# Patient Record
Sex: MALE | Race: WHITE | NOT HISPANIC OR LATINO | Employment: FULL TIME | URBAN - METROPOLITAN AREA
[De-identification: names, ages, dates, MRNs, and addresses within clinical notes are randomized per-mention and may not be internally consistent; named-entity substitution may affect disease eponyms.]

---

## 2019-11-25 ENCOUNTER — TELEPHONE (OUTPATIENT)
Dept: FAMILY MEDICINE | Facility: CLINIC | Age: 39
End: 2019-11-25

## 2019-12-05 RX ORDER — ALPRAZOLAM 0.5 MG/1
0.5 TABLET ORAL 2 TIMES DAILY
Refills: 2 | COMMUNITY
Start: 2019-10-24 | End: 2019-12-05 | Stop reason: SDUPTHER

## 2019-12-05 RX ORDER — ALPRAZOLAM 0.5 MG/1
0.5 TABLET ORAL 2 TIMES DAILY
Qty: 60 TABLET | Refills: 0 | Status: SHIPPED | OUTPATIENT
Start: 2019-12-05 | End: 2019-12-10 | Stop reason: SDUPTHER

## 2019-12-05 NOTE — TELEPHONE ENCOUNTER
----- Message from Michelle Layne sent at 12/5/2019  9:41 AM CST -----  Contact: amanda  Refill Xanax [facility] # [phone]. The patient is leaving to go work in [employer] at 12:30  today.  He is out. He will be home for his apt. GH

## 2019-12-10 ENCOUNTER — OFFICE VISIT (OUTPATIENT)
Dept: FAMILY MEDICINE | Facility: CLINIC | Age: 39
End: 2019-12-10
Payer: COMMERCIAL

## 2019-12-10 VITALS
HEIGHT: 70 IN | BODY MASS INDEX: 27.66 KG/M2 | SYSTOLIC BLOOD PRESSURE: 136 MMHG | DIASTOLIC BLOOD PRESSURE: 84 MMHG | WEIGHT: 193.19 LBS | HEART RATE: 72 BPM

## 2019-12-10 DIAGNOSIS — F41.9 ANXIETY: Primary | ICD-10-CM

## 2019-12-10 DIAGNOSIS — R03.0 PRE-HYPERTENSION: ICD-10-CM

## 2019-12-10 PROCEDURE — 99213 OFFICE O/P EST LOW 20 MIN: CPT | Mod: S$GLB,,, | Performed by: PHYSICIAN ASSISTANT

## 2019-12-10 PROCEDURE — 99213 PR OFFICE/OUTPT VISIT, EST, LEVL III, 20-29 MIN: ICD-10-PCS | Mod: S$GLB,,, | Performed by: PHYSICIAN ASSISTANT

## 2019-12-10 PROCEDURE — 3008F BODY MASS INDEX DOCD: CPT | Mod: S$GLB,,, | Performed by: PHYSICIAN ASSISTANT

## 2019-12-10 PROCEDURE — 3008F PR BODY MASS INDEX (BMI) DOCUMENTED: ICD-10-PCS | Mod: S$GLB,,, | Performed by: PHYSICIAN ASSISTANT

## 2019-12-10 RX ORDER — ALPRAZOLAM 1 MG/1
1 TABLET ORAL 2 TIMES DAILY
Qty: 60 TABLET | Refills: 2 | Status: SHIPPED | OUTPATIENT
Start: 2019-12-10 | End: 2020-03-23 | Stop reason: SDUPTHER

## 2019-12-10 NOTE — PROGRESS NOTES
SUBJECTIVE:    Patient ID: Al Romero is a 39 y.o. male.    Chief Complaint: Anxiety (Pt presents today for anxiety.  States he was placed on Xanax (1 in am and 1 in pm), but has been having to take 2 in the am.  Pt also reports fatigue.....mlr) and Medication Refill    39-year-old white male presents today for checkup.  Reports he is still fighting anxiety pretty much daily.  He admits that he is using 2 of the Xanax in the morning, drinking energy drinks throughout the day, and still experiencing some fatigue.  Blood work was all normal earlier this year.       No visits with results within 6 Month(s) from this visit.   Latest known visit with results is:   No results found for any previous visit.       Past Medical History:   Diagnosis Date    Anxiety      History reviewed. No pertinent surgical history.  History reviewed. No pertinent family history.    Marital Status:   Alcohol History:  reports that he drinks alcohol.  Tobacco History:  reports that he has been smoking cigarettes and vaping with nicotine. He has never used smokeless tobacco.  Drug History:  reports that he does not use drugs.    Review of patient's allergies indicates:  No Known Allergies    Current Outpatient Medications:     ALPRAZolam (XANAX) 1 MG tablet, Take 1 tablet (1 mg total) by mouth 2 (two) times daily., Disp: 60 tablet, Rfl: 2    Review of Systems   Constitutional: Negative for activity change, fatigue, fever and unexpected weight change.   HENT: Negative for congestion.    Respiratory: Negative for apnea, cough, chest tightness and shortness of breath.    Cardiovascular: Negative for chest pain and palpitations.   Gastrointestinal: Negative for abdominal distention and abdominal pain.   Genitourinary: Negative for difficulty urinating and dysuria.   Musculoskeletal: Negative for arthralgias and back pain.   Neurological: Negative for dizziness and weakness.          Objective:      Vitals:    12/10/19 1502 12/10/19  "1509 12/10/19 1537   BP: (!) 150/86 (!) 150/90 136/84   Pulse: 72     Weight: 87.6 kg (193 lb 3.2 oz)     Height: 5' 10" (1.778 m)       Physical Exam   Constitutional: He is oriented to person, place, and time. He appears well-developed and well-nourished. No distress.   HENT:   Head: Normocephalic and atraumatic.   Eyes: Pupils are equal, round, and reactive to light.   Neck: Normal range of motion. Neck supple. No thyromegaly present.   Cardiovascular: Normal rate, regular rhythm, normal heart sounds and intact distal pulses.   Pulmonary/Chest: Effort normal and breath sounds normal.   Abdominal: Soft. Bowel sounds are normal. He exhibits no distension. There is no tenderness.   Musculoskeletal: Normal range of motion.   Neurological: He is alert and oriented to person, place, and time. No cranial nerve deficit.   Skin: Skin is warm and dry. No rash noted. No erythema.         Assessment:       1. Anxiety    2. Pre-hypertension         Plan:       Anxiety  Comments:  Doing fairly well, has been taking equivalent of 1mg BID. Will refill. Stress with  and kids at home.  Orders:  -     ALPRAZolam (XANAX) 1 MG tablet; Take 1 tablet (1 mg total) by mouth 2 (two) times daily.  Dispense: 60 tablet; Refill: 2    Pre-hypertension  Comments:  Discussed caffeine, vaping, energy drinks. Will continue to monitor closely. Checked by me in clinic today.      Follow up in about 3 months (around 3/10/2020) for Annual Physical.        12/10/2019 Devaughn Banegas PA-C    "

## 2019-12-10 NOTE — PATIENT INSTRUCTIONS
Anxiety Reaction  Anxiety is the feeling we all get when we think something bad might happen. It is a normal response to stress and usually causes only a mild reaction. When anxiety becomes more severe, it can interfere with daily life. In some cases, you may not even be aware of what it is youre anxious about. There may also be a genetic link or it may be a learned behavior in the home.  Both psychological and physical triggers cause stress reaction. It's often a response to fear or emotional stress, real or imagined. This stress may come from home, family, work, or social relationships.  During an anxiety reaction, you may feel:  · Helpless  · Nervous  · Depressed  · Irritable  Your body may show signs of anxiety in many ways. You may experience:  · Dry mouth  · Shakiness  · Dizziness  · Weakness  · Trouble breathing  · Breathing fast (hyperventilating)  · Chest pressure  · Sweating  · Headache  · Nausea  · Diarrhea  · Tiredness  · Inability to sleep  · Sexual problems  Home care  · Try to locate the sources of stress in your life. They may not be obvious. These may include:  ¨ Daily hassles of life (traffic jams, missed appointments, car troubles, etc.)  ¨ Major life changes, both good (new baby, job promotion) and bad (loss of job, loss of loved one)  ¨ Overload: feeling that you have too many responsibilities and can't take care of all of them at once  ¨ Feeling helpless, feeling that your problems are beyond what youre able to solve  · Notice how your body reacts to stress. Learn to listen to your body signals. This will help you take action before the stress becomes severe.  · When you can, do something about the source of your stress. (Avoid hassles, limit the amount of change that happens in your life at one time and take a break when you feel overloaded).  · Unfortunately, many stressful situations can't be avoided. It is necessary to learn how to better manage stress. There are many proven methods  that will reduce your anxiety. These include simple things like exercise, good nutrition and adequate rest. Also, there are certain techniques that are helpful:  ¨ Relaxation  ¨ Breathing exercises  ¨ Visualization  ¨ Biofeedback  ¨ Meditation  For more information about this, consult your doctor or go to a local bookstore and review the many books and tapes available on this subject.  Follow-up care  If you feel that your anxiety is not responding to self-help measures, contact your doctor or make an appointment with a counselor. You may need short-term psychological counseling and temporary medicine to help you manage stress.  Call 911  Call your healthcare provider right away if any of these occur:  · Trouble breathing  · Confusion  · Drowsiness or trouble wakening  · Fainting or loss of consciousness  · Rapid heart rate  · Seizure  · New chest pain that becomes more severe, lasts longer, or spreads into your shoulder, arm, neck, jaw, or back  When to seek medical advice  Call your healthcare provider right away if any of these occur:  · Your symptoms get worse  · Severe headache not relieved by rest and mild pain reliever  Date Last Reviewed: 9/29/2015  © 6011-1272 [employer]. [address] [place] [address]. All rights reserved. This information is not intended as a substitute for professional medical care. Always follow your healthcare professional's instructions.

## 2020-03-17 ENCOUNTER — TELEPHONE (OUTPATIENT)
Dept: FAMILY MEDICINE | Facility: CLINIC | Age: 40
End: 2020-03-17

## 2020-03-17 DIAGNOSIS — F41.9 ANXIETY: ICD-10-CM

## 2020-03-17 RX ORDER — ALPRAZOLAM 1 MG/1
1 TABLET ORAL 2 TIMES DAILY
Qty: 60 TABLET | Refills: 2 | Status: CANCELLED | OUTPATIENT
Start: 2020-03-17 | End: 2020-06-15

## 2020-03-17 NOTE — TELEPHONE ENCOUNTER
----- Message from Liat Linares sent at 3/17/2020 11:19 AM CDT -----  Refills on xanax 1 mg to [facility]  # [phone]

## 2020-03-23 ENCOUNTER — TELEPHONE (OUTPATIENT)
Dept: FAMILY MEDICINE | Facility: CLINIC | Age: 40
End: 2020-03-23

## 2020-03-23 ENCOUNTER — PATIENT MESSAGE (OUTPATIENT)
Dept: FAMILY MEDICINE | Facility: CLINIC | Age: 40
End: 2020-03-23

## 2020-03-23 DIAGNOSIS — F41.9 ANXIETY: ICD-10-CM

## 2020-03-23 RX ORDER — ALPRAZOLAM 1 MG/1
1 TABLET ORAL 2 TIMES DAILY
Qty: 60 TABLET | Refills: 2 | Status: SHIPPED | OUTPATIENT
Start: 2020-03-23 | End: 2020-06-25 | Stop reason: SDUPTHER

## 2020-03-23 NOTE — TELEPHONE ENCOUNTER
"----- Message from Alma Shaw MA sent at 3/23/2020  2:42 PM CDT -----  Pt would like to schedule a virtual visit with Lalo for a cough, fatigue, bodyaches x 1 week and headaches.  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  Has not traveled outside of the country.  Reports he is a smoker.  Please return call.    Pt - [phone]  "

## 2020-03-24 ENCOUNTER — OFFICE VISIT (OUTPATIENT)
Dept: FAMILY MEDICINE | Facility: CLINIC | Age: 40
End: 2020-03-24
Payer: COMMERCIAL

## 2020-03-24 DIAGNOSIS — F33.1 MAJOR DEPRESSIVE DISORDER, RECURRENT, MODERATE: ICD-10-CM

## 2020-03-24 DIAGNOSIS — R05.9 COUGH: Primary | ICD-10-CM

## 2020-03-24 DIAGNOSIS — R53.82 CHRONIC FATIGUE: ICD-10-CM

## 2020-03-24 DIAGNOSIS — J30.1 SEASONAL ALLERGIC RHINITIS DUE TO POLLEN: ICD-10-CM

## 2020-03-24 DIAGNOSIS — F17.200 CURRENT EVERY DAY SMOKER: ICD-10-CM

## 2020-03-24 PROCEDURE — 99213 OFFICE O/P EST LOW 20 MIN: CPT | Mod: 95,,, | Performed by: NURSE PRACTITIONER

## 2020-03-24 PROCEDURE — 99213 PR OFFICE/OUTPT VISIT, EST, LEVL III, 20-29 MIN: ICD-10-PCS | Mod: 95,,, | Performed by: NURSE PRACTITIONER

## 2020-03-24 RX ORDER — METHYLPREDNISOLONE 4 MG/1
TABLET ORAL
Qty: 1 PACKAGE | Refills: 0 | Status: SHIPPED | OUTPATIENT
Start: 2020-03-24 | End: 2020-04-14

## 2020-03-24 NOTE — PROGRESS NOTES
Subjective:        The chief complaint leading to consultation is: Cough  The patient location is:  in his car  Visit type: Virtual visit with synchronous audio and video    Pt c/o cough since last Monday. Believes it is his seasonal allergies or a sinus infection. Reports cough is productive upon first waking but becomes more of a dry tickle throughout the day. 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 He has recently stopped taking Ibuprofen d/t news reports to not take ibuprofen with covid 19 virus outbreak. He states he has been taking Tylenol off and on. Denies fevers or SOB. Pt is current everyday smoker. He is fatigued but reports chronic fatigue for last few years. Deals with anxiety and takes Xanax regularly. Has taken claritin here and there as well as otc cough medication. Cough does not keep him awake at night.         No past surgical history on file.  Past Medical History:   Diagnosis Date    Anxiety      No family history on file.     Social History:   Marital Status:   Alcohol History:  reports that he drinks alcohol.  Tobacco History:  reports that he has been smoking cigarettes and vaping with nicotine. He has never used smokeless tobacco.  Drug History:  reports that he does not use drugs.    Review of patient's allergies indicates:  No Known Allergies    Current Outpatient Medications   Medication Sig Dispense Refill    ALPRAZolam (XANAX) 1 MG tablet Take 1 tablet (1 mg total) by mouth 2 (two) times daily. 60 tablet 2    methylPREDNISolone (MEDROL DOSEPACK) 4 mg tablet use as directed 1 Package 0     No current facility-administered medications for this visit.        Review of Systems   Constitutional: Positive for fatigue. Negative for chills, diaphoresis and fever.   HENT: Positive for postnasal drip and rhinorrhea. Negative for congestion, sinus pressure and sore throat.    Respiratory: Positive for cough. Negative for shortness of breath and  wheezing.    Cardiovascular: Negative for chest pain and palpitations.   Gastrointestinal: Negative for nausea.   Musculoskeletal: Positive for myalgias.   Neurological: Negative for headaches.         Objective:        Physical Exam:   Physical Exam   Constitutional: He is oriented to person, place, and time. He appears well-developed and well-nourished. No distress.   Neurological: He is alert and oriented to person, place, and time.   Psychiatric: He has a normal mood and affect.            Assessment:       1. Cough    2. Seasonal allergic rhinitis due to pollen    3. Chronic fatigue    4. Major depressive disorder, recurrent, moderate    5. Current every day smoker      Plan:   Cough  -     methylPREDNISolone (MEDROL DOSEPACK) 4 mg tablet; use as directed  Dispense: 1 Package; Refill: 0    Seasonal allergic rhinitis due to pollen  -     methylPREDNISolone (MEDROL DOSEPACK) 4 mg tablet; use as directed  Dispense: 1 Package; Refill: 0  - Advised patient to take otc allergy medication everyday as it works better when taken this way. Also suggested use of Flonase daily during allergy season.    Chronic fatigue  - Consider workup at later date    Major depressive disorder, recurrent, moderate  - continue Xanax    Current every day smoker  - uncomplicated    Follow up if symptoms worsen or fail to improve.    Total time spent with patient: 20    Each patient to whom he or she provides medical services by telemedicine is:  (1) informed of the relationship between the physician and patient and the respective role of any other health care provider with respect to management of the patient; and (2) notified that he or she may decline to receive medical services by telemedicine and may withdraw from such care at any time.    This note was created using [employer] voice recognition software that occasionally misinterprets phrases or words.

## 2020-03-31 ENCOUNTER — PATIENT MESSAGE (OUTPATIENT)
Dept: FAMILY MEDICINE | Facility: CLINIC | Age: 40
End: 2020-03-31

## 2020-04-13 ENCOUNTER — PATIENT MESSAGE (OUTPATIENT)
Dept: FAMILY MEDICINE | Facility: CLINIC | Age: 40
End: 2020-04-13

## 2020-04-13 DIAGNOSIS — R05.3 PERSISTENT COUGH FOR 3 WEEKS OR LONGER: Primary | ICD-10-CM

## 2020-04-13 NOTE — TELEPHONE ENCOUNTER
I ordered a CXR to get done and I would also like some basic labs as well. They have been placed for [facility]. CXR he can get done anytime at the imaging center.

## 2020-04-14 ENCOUNTER — HOSPITAL ENCOUNTER (OUTPATIENT)
Dept: RADIOLOGY | Facility: HOSPITAL | Age: 40
Discharge: HOME | End: 2020-04-14
Attending: PHYSICIAN ASSISTANT
Payer: COMMERCIAL

## 2020-04-14 DIAGNOSIS — R05.3 PERSISTENT COUGH FOR 3 WEEKS OR LONGER: ICD-10-CM

## 2020-04-14 PROCEDURE — 71046 X-RAY EXAM CHEST 2 VIEWS: CPT | Mod: TC,PO

## 2020-04-15 LAB
ALBUMIN SERPL-MCNC: 4.3 G/DL (ref 3.6–5.1)
ALBUMIN/GLOB SERPL: 1.8 (CALC) (ref 1–2.5)
ALP SERPL-CCNC: 96 U/L (ref 36–130)
ALT SERPL-CCNC: 17 U/L (ref 9–46)
AST SERPL-CCNC: 12 U/L (ref 10–40)
BASOPHILS # BLD AUTO: 101 CELLS/UL (ref 0–200)
BASOPHILS NFR BLD AUTO: 1.5 %
BILIRUB SERPL-MCNC: 0.5 MG/DL (ref 0.2–1.2)
BUN SERPL-MCNC: 11 MG/DL (ref 7–25)
BUN/CREAT SERPL: NORMAL (CALC) (ref 6–22)
CALCIUM SERPL-MCNC: 9.3 MG/DL (ref 8.6–10.3)
CHLORIDE SERPL-SCNC: 107 MMOL/L (ref 98–110)
CO2 SERPL-SCNC: 27 MMOL/L (ref 20–32)
CREAT SERPL-MCNC: 0.9 MG/DL (ref 0.6–1.35)
EOSINOPHIL # BLD AUTO: 241 CELLS/UL (ref 15–500)
EOSINOPHIL NFR BLD AUTO: 3.6 %
ERYTHROCYTE [DISTWIDTH] IN BLOOD BY AUTOMATED COUNT: 12.6 % (ref 11–15)
GFRSERPLBLD MDRD-ARVRAT: 109 ML/MIN/1.73M2
GLOBULIN SER CALC-MCNC: 2.4 G/DL (CALC) (ref 1.9–3.7)
GLUCOSE SERPL-MCNC: 100 MG/DL (ref 65–139)
HCT VFR BLD AUTO: 44.4 % (ref 38.5–50)
HGB BLD-MCNC: 15.2 G/DL (ref 13.2–17.1)
LYMPHOCYTES # BLD AUTO: 1816 CELLS/UL (ref 850–3900)
LYMPHOCYTES NFR BLD AUTO: 27.1 %
MCH RBC QN AUTO: 32.3 PG (ref 27–33)
MCHC RBC AUTO-ENTMCNC: 34.2 G/DL (ref 32–36)
MCV RBC AUTO: 94.3 FL (ref 80–100)
MONOCYTES # BLD AUTO: 844 CELLS/UL (ref 200–950)
MONOCYTES NFR BLD AUTO: 12.6 %
NEUTROPHILS # BLD AUTO: 3698 CELLS/UL (ref 1500–7800)
NEUTROPHILS NFR BLD AUTO: 55.2 %
PLATELET # BLD AUTO: 230 THOUSAND/UL (ref 140–400)
PMV BLD REES-ECKER: 11 FL (ref 7.5–12.5)
POTASSIUM SERPL-SCNC: 3.9 MMOL/L (ref 3.5–5.3)
PROT SERPL-MCNC: 6.7 G/DL (ref 6.1–8.1)
RBC # BLD AUTO: 4.71 MILLION/UL (ref 4.2–5.8)
SODIUM SERPL-SCNC: 141 MMOL/L (ref 135–146)
WBC # BLD AUTO: 6.7 THOUSAND/UL (ref 3.8–10.8)

## 2020-06-24 ENCOUNTER — PATIENT MESSAGE (OUTPATIENT)
Dept: FAMILY MEDICINE | Facility: CLINIC | Age: 40
End: 2020-06-24

## 2020-06-25 ENCOUNTER — OFFICE VISIT (OUTPATIENT)
Dept: FAMILY MEDICINE | Facility: CLINIC | Age: 40
End: 2020-06-25
Payer: COMMERCIAL

## 2020-06-25 VITALS
HEIGHT: 70 IN | TEMPERATURE: 99 F | SYSTOLIC BLOOD PRESSURE: 138 MMHG | WEIGHT: 186 LBS | DIASTOLIC BLOOD PRESSURE: 88 MMHG | HEART RATE: 68 BPM | BODY MASS INDEX: 26.63 KG/M2

## 2020-06-25 DIAGNOSIS — F41.9 ANXIETY: ICD-10-CM

## 2020-06-25 DIAGNOSIS — R53.82 CHRONIC FATIGUE: Primary | ICD-10-CM

## 2020-06-25 PROCEDURE — 99214 OFFICE O/P EST MOD 30 MIN: CPT | Mod: S$GLB,,, | Performed by: PHYSICIAN ASSISTANT

## 2020-06-25 PROCEDURE — 3008F BODY MASS INDEX DOCD: CPT | Mod: S$GLB,,, | Performed by: PHYSICIAN ASSISTANT

## 2020-06-25 PROCEDURE — 3008F PR BODY MASS INDEX (BMI) DOCUMENTED: ICD-10-PCS | Mod: S$GLB,,, | Performed by: PHYSICIAN ASSISTANT

## 2020-06-25 PROCEDURE — 99214 PR OFFICE/OUTPT VISIT, EST, LEVL IV, 30-39 MIN: ICD-10-PCS | Mod: S$GLB,,, | Performed by: PHYSICIAN ASSISTANT

## 2020-06-25 RX ORDER — ALPRAZOLAM 1 MG/1
1 TABLET ORAL 2 TIMES DAILY
Qty: 60 TABLET | Refills: 5 | Status: SHIPPED | OUTPATIENT
Start: 2020-06-25 | End: 2020-12-16 | Stop reason: SDUPTHER

## 2020-06-25 NOTE — PATIENT INSTRUCTIONS
Anxiety Reaction  Anxiety is the feeling we all get when we think something bad might happen. It is a normal response to stress and usually causes only a mild reaction. When anxiety becomes more severe, it can interfere with daily life. In some cases, you may not even be aware of what it is youre anxious about. There may also be a genetic link or it may be a learned behavior in the home.  Both psychological and physical triggers cause stress reaction. It's often a response to fear or emotional stress, real or imagined. This stress may come from home, family, work, or social relationships.  During an anxiety reaction, you may feel:  · Helpless  · Nervous  · Depressed  · Irritable  Your body may show signs of anxiety in many ways. You may experience:  · Dry mouth  · Shakiness  · Dizziness  · Weakness  · Trouble breathing  · Breathing fast (hyperventilating)  · Chest pressure  · Sweating  · Headache  · Nausea  · Diarrhea  · Tiredness  · Inability to sleep  · Sexual problems  Home care  · Try to locate the sources of stress in your life. They may not be obvious. These may include:  ¨ Daily hassles of life (traffic jams, missed appointments, car troubles, etc.)  ¨ Major life changes, both good (new baby, job promotion) and bad (loss of job, loss of loved one)  ¨ Overload: feeling that you have too many responsibilities and can't take care of all of them at once  ¨ Feeling helpless, feeling that your problems are beyond what youre able to solve  · Notice how your body reacts to stress. Learn to listen to your body signals. This will help you take action before the stress becomes severe.  · When you can, do something about the source of your stress. (Avoid hassles, limit the amount of change that happens in your life at one time and take a break when you feel overloaded).  · Unfortunately, many stressful situations can't be avoided. It is necessary to learn how to better manage stress. There are many proven methods  that will reduce your anxiety. These include simple things like exercise, good nutrition and adequate rest. Also, there are certain techniques that are helpful:  ¨ Relaxation  ¨ Breathing exercises  ¨ Visualization  ¨ Biofeedback  ¨ Meditation  For more information about this, consult your doctor or go to a local bookstore and review the many books and tapes available on this subject.  Follow-up care  If you feel that your anxiety is not responding to self-help measures, contact your doctor or make an appointment with a counselor. You may need short-term psychological counseling and temporary medicine to help you manage stress.  Call 911  Call your healthcare provider right away if any of these occur:  · Trouble breathing  · Confusion  · Drowsiness or trouble wakening  · Fainting or loss of consciousness  · Rapid heart rate  · Seizure  · New chest pain that becomes more severe, lasts longer, or spreads into your shoulder, arm, neck, jaw, or back  When to seek medical advice  Call your healthcare provider right away if any of these occur:  · Your symptoms get worse  · Severe headache not relieved by rest and mild pain reliever  Date Last Reviewed: 9/29/2015  © 5176-1263 [employer]. [address] [place] [address]. All rights reserved. This information is not intended as a substitute for professional medical care. Always follow your healthcare professional's instructions.

## 2020-06-25 NOTE — PROGRESS NOTES
SUBJECTIVE:    Patient ID: lA Romero is a 39 y.o. male.    Chief Complaint: Medication Refill (no bottles, went over meds verbally// SW)    40 yo wm presents for regular checkup and refills. Reports that he has been doing pretty well overall. Now pretty well managed with prn alprazolam use but usually morning and evening. Has been intolerant of other medications to help with anxiety and mood. New baby in the house. Denies any Cp/SOB. Some slight fatigue persisting. Had cbc/cmp done in April.       Patient Message on 04/13/2020   Component Date Value Ref Range Status    WBC 04/14/2020 6.7  3.8 - 10.8 Thousand/uL Final    RBC 04/14/2020 4.71  4.20 - 5.80 Million/uL Final    Hemoglobin 04/14/2020 15.2  13.2 - 17.1 g/dL Final    Hematocrit 04/14/2020 44.4  38.5 - 50.0 % Final    Mean Corpuscular Volume 04/14/2020 94.3  80.0 - 100.0 fL Final    Mean Corpuscular Hemoglobin 04/14/2020 32.3  27.0 - 33.0 pg Final    Mean Corpuscular Hemoglobin Conc 04/14/2020 34.2  32.0 - 36.0 g/dL Final    RDW 04/14/2020 12.6  11.0 - 15.0 % Final    Platelets 04/14/2020 230  140 - 400 Thousand/uL Final    MPV 04/14/2020 11.0  7.5 - 12.5 fL Final    Neutrophils Absolute 04/14/2020 3,698  1,500 - 7,800 cells/uL Final    Lymph # 04/14/2020 1,816  850 - 3,900 cells/uL Final    Mono # 04/14/2020 844  200 - 950 cells/uL Final    Eos # 04/14/2020 241  15 - 500 cells/uL Final    Baso # 04/14/2020 101  0 - 200 cells/uL Final    Neutrophils Relative 04/14/2020 55.2  % Final    Lymph% 04/14/2020 27.1  % Final    Mono% 04/14/2020 12.6  % Final    Eosinophil% 04/14/2020 3.6  % Final    Basophil% 04/14/2020 1.5  % Final    Glucose 04/14/2020 100  65 - 139 mg/dL Final    BUN, Bld 04/14/2020 11  7 - 25 mg/dL Final    Creatinine 04/14/2020 0.90  0.60 - 1.35 mg/dL Final    eGFR if non African American 04/14/2020 109  > OR = 60 mL/min/1.73m2 Final    eGFR if African American 04/14/2020 126  > OR = 60 mL/min/1.73m2 Final     BUN/Creatinine Ratio 04/14/2020 NOT APPLICABLE  6 - 22 (calc) Final    Sodium 04/14/2020 141  135 - 146 mmol/L Final    Potassium 04/14/2020 3.9  3.5 - 5.3 mmol/L Final    Chloride 04/14/2020 107  98 - 110 mmol/L Final    CO2 04/14/2020 27  20 - 32 mmol/L Final    Calcium 04/14/2020 9.3  8.6 - 10.3 mg/dL Final    Total Protein 04/14/2020 6.7  6.1 - 8.1 g/dL Final    Albumin 04/14/2020 4.3  3.6 - 5.1 g/dL Final    Globulin, Total 04/14/2020 2.4  1.9 - 3.7 g/dL (calc) Final    Albumin/Globulin Ratio 04/14/2020 1.8  1.0 - 2.5 (calc) Final    Total Bilirubin 04/14/2020 0.5  0.2 - 1.2 mg/dL Final    Alkaline Phosphatase 04/14/2020 96  36 - 130 U/L Final    AST 04/14/2020 12  10 - 40 U/L Final    ALT 04/14/2020 17  9 - 46 U/L Final       Past Medical History:   Diagnosis Date    Anxiety      History reviewed. No pertinent surgical history.  History reviewed. No pertinent family history.    Marital Status:   Alcohol History:  reports current alcohol use.  Tobacco History:  reports that he has been smoking cigarettes and vaping with nicotine. He has never used smokeless tobacco.  Drug History:  reports no history of drug use.    Review of patient's allergies indicates:  No Known Allergies    Current Outpatient Medications:     ALPRAZolam (XANAX) 1 MG tablet, Take 1 tablet (1 mg total) by mouth 2 (two) times daily., Disp: 60 tablet, Rfl: 5    Review of Systems   Constitutional: Negative for activity change, fatigue, fever and unexpected weight change.   HENT: Negative for congestion.    Respiratory: Negative for apnea, cough, chest tightness and shortness of breath.    Cardiovascular: Negative for chest pain and palpitations.   Gastrointestinal: Negative for abdominal distention and abdominal pain.   Genitourinary: Negative for difficulty urinating and dysuria.   Musculoskeletal: Negative for arthralgias and back pain.   Neurological: Negative for dizziness and weakness.          Objective:      Vitals:  "   20 1153   BP: 138/88   Pulse: 68   Temp: 98.5 °F (36.9 °C)   Weight: 84.4 kg (186 lb)   Height: 5' 10" (1.778 m)     Physical Exam  Constitutional:       General: He is not in acute distress.     Appearance: He is well-developed.   HENT:      Head: Normocephalic and atraumatic.   Eyes:      Pupils: Pupils are equal, round, and reactive to light.   Neck:      Musculoskeletal: Normal range of motion and neck supple.      Thyroid: No thyromegaly.   Cardiovascular:      Rate and Rhythm: Normal rate and regular rhythm.      Heart sounds: Normal heart sounds.   Pulmonary:      Effort: Pulmonary effort is normal.      Breath sounds: Normal breath sounds.   Abdominal:      General: Bowel sounds are normal. There is no distension.      Palpations: Abdomen is soft.      Tenderness: There is no abdominal tenderness.   Musculoskeletal: Normal range of motion.   Skin:     General: Skin is warm and dry.      Findings: No erythema or rash.   Neurological:      Mental Status: He is alert and oriented to person, place, and time.      Cranial Nerves: No cranial nerve deficit.           Assessment:       1. Chronic fatigue    2. Anxiety         Plan:       Chronic fatigue  Comments:  will check lipids and TSH now for further eval.  Orders:  -     TSH w/reflex to FT4; Future; Expected date: 2020  -     Lipid Panel; Future; Expected date: 2020    Anxiety  Comments:  Doing fairly well, has been taking equivalent of 1mg BID. Will refill. Stress with  and kids at home.  Orders:  -     ALPRAZolam (XANAX) 1 MG tablet; Take 1 tablet (1 mg total) by mouth 2 (two) times daily.  Dispense: 60 tablet; Refill: 5      Follow up in about 6 months (around 2020) for Annual Physical.        2020 Devaughn Banegas PA-C      "

## 2020-07-06 ENCOUNTER — LAB VISIT (OUTPATIENT)
Dept: PRIMARY CARE CLINIC | Facility: CLINIC | Age: 40
End: 2020-07-06
Payer: COMMERCIAL

## 2020-07-06 ENCOUNTER — TELEPHONE (OUTPATIENT)
Dept: FAMILY MEDICINE | Facility: CLINIC | Age: 40
End: 2020-07-06

## 2020-07-06 VITALS — TEMPERATURE: 99 F

## 2020-07-06 DIAGNOSIS — R51.9 HEAD ACHE: ICD-10-CM

## 2020-07-06 DIAGNOSIS — Z20.822 EXPOSURE TO COVID-19 VIRUS: Primary | ICD-10-CM

## 2020-07-06 PROCEDURE — U0003 INFECTIOUS AGENT DETECTION BY NUCLEIC ACID (DNA OR RNA); SEVERE ACUTE RESPIRATORY SYNDROME CORONAVIRUS 2 (SARS-COV-2) (CORONAVIRUS DISEASE [COVID-19]), AMPLIFIED PROBE TECHNIQUE, MAKING USE OF HIGH THROUGHPUT TECHNOLOGIES AS DESCRIBED BY CMS-2020-01-R: HCPCS

## 2020-07-06 NOTE — TELEPHONE ENCOUNTER
Patient advised of new policy and procedure effective today. Says he had a headache this am and would like an order for a covid test done. Please advise. Thanks

## 2020-07-06 NOTE — PROGRESS NOTES
Subjective:        Time seen by provider: 2:24 PM on 07/06/2020    Al Romero is a 39 y.o. male who presents for an evaluation of possible COVID-19.       The patient denies *** or any other symptoms at this time. No pulmonary PMHx or PSHx.     Review of Systems   Constitutional: Negative for activity change, appetite change, fatigue and fever.   HENT: Negative for congestion, rhinorrhea and sore throat.    Respiratory: Negative for cough, chest tightness, shortness of breath and wheezing.    Cardiovascular: Negative for chest pain and palpitations.   Gastrointestinal: Negative for diarrhea, nausea and vomiting.   Musculoskeletal: Negative for arthralgias and myalgias.   Skin: Negative for rash.   Neurological: Negative for weakness, light-headedness and headaches.       Objective:      Physical Exam  Vitals signs and nursing note reviewed.   Constitutional:       General: He is not in acute distress.     Appearance: He is well-developed. He is not diaphoretic.   HENT:      Head: Normocephalic and atraumatic.      Nose: Nose normal.   Eyes:      Conjunctiva/sclera: Conjunctivae normal.   Neck:      Musculoskeletal: Normal range of motion.   Cardiovascular:      Rate and Rhythm: Normal rate and regular rhythm.      Heart sounds: Normal heart sounds. No murmur.   Pulmonary:      Effort: Pulmonary effort is normal. No respiratory distress.      Breath sounds: Normal breath sounds. No wheezing.   Musculoskeletal: Normal range of motion.   Skin:     General: Skin is warm and dry.   Neurological:      Mental Status: He is alert and oriented to person, place, and time.         Assessment:       No diagnosis found.    Plan:       There are no diagnoses linked to this encounter.  2. Discharge home and await results.   3. Return to clinic or ED for new or worsening symptoms.   4. Follow-up with PCP as needed.     Scribe Attestation:   Elizabeth COX, am scribing for, and in the presence of, ORESTES Hernandez  performed the above scribed service and the documentation accurately describes the services I performed. I attest to the accuracy of the note.    ***

## 2020-07-06 NOTE — TELEPHONE ENCOUNTER
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 His company is telling him he has to get tested before he can return to the job site. Pt does not have any symptoms right now, but thinks he already had Covid 19 a few months ago. Can you send an order over for him. Please advise. Pt #[phone]

## 2020-07-07 LAB — SARS-COV-2 RNA RESP QL NAA+PROBE: NOT DETECTED

## 2020-07-16 ENCOUNTER — PATIENT MESSAGE (OUTPATIENT)
Dept: FAMILY MEDICINE | Facility: CLINIC | Age: 40
End: 2020-07-16

## 2020-07-17 ENCOUNTER — TELEPHONE (OUTPATIENT)
Dept: FAMILY MEDICINE | Facility: CLINIC | Age: 40
End: 2020-07-17

## 2020-07-17 NOTE — TELEPHONE ENCOUNTER
----- Message from Irene Driver sent at 7/17/2020  9:44 AM CDT -----  Regarding: pharm needing call back  Pt is trying to get a early fill for xanax   [facility] tech [facility] [phone]

## 2020-12-05 LAB
CHOLEST SERPL-MCNC: 148 MG/DL
CHOLEST/HDLC SERPL: 2.5 (CALC)
HDLC SERPL-MCNC: 59 MG/DL
LDLC SERPL CALC-MCNC: 75 MG/DL (CALC)
NONHDLC SERPL-MCNC: 89 MG/DL (CALC)
TRIGL SERPL-MCNC: 48 MG/DL
TSH SERPL-ACNC: 2.06 MIU/L (ref 0.4–4.5)

## 2020-12-07 ENCOUNTER — TELEPHONE (OUTPATIENT)
Dept: FAMILY MEDICINE | Facility: CLINIC | Age: 40
End: 2020-12-07

## 2020-12-07 NOTE — TELEPHONE ENCOUNTER
Spoke to pt regarding needing to reschedule do to provider family emergency. Pt rescheduled appointment for 1/19/21.

## 2020-12-16 DIAGNOSIS — F41.9 ANXIETY: ICD-10-CM

## 2020-12-16 NOTE — TELEPHONE ENCOUNTER
----- Message from Liat Linares sent at 12/16/2020  3:32 PM CST -----  Pt had to be r/s due to provider out he is asking for a refill on his Xanax until his next appt.   [facility] [address] # [phone]

## 2020-12-17 RX ORDER — ALPRAZOLAM 1 MG/1
1 TABLET ORAL 2 TIMES DAILY
Qty: 60 TABLET | Refills: 0 | Status: SHIPPED | OUTPATIENT
Start: 2020-12-17 | End: 2020-12-17 | Stop reason: SDUPTHER

## 2020-12-18 ENCOUNTER — CLINICAL SUPPORT (OUTPATIENT)
Dept: URGENT CARE | Facility: CLINIC | Age: 40
End: 2020-12-18
Payer: COMMERCIAL

## 2020-12-18 VITALS — TEMPERATURE: 98 F

## 2020-12-18 DIAGNOSIS — Z03.818 ENCNTR FOR OBS FOR SUSP EXPSR TO OTH BIOLG AGENTS RULED OUT: Primary | ICD-10-CM

## 2020-12-18 LAB
CTP QC/QA: YES
SARS-COV-2 RDRP RESP QL NAA+PROBE: NEGATIVE

## 2020-12-18 PROCEDURE — U0002 COVID-19 LAB TEST NON-CDC: HCPCS | Mod: QW,S$GLB,, | Performed by: PHYSICIAN ASSISTANT

## 2020-12-18 PROCEDURE — U0002: ICD-10-PCS | Mod: QW,S$GLB,, | Performed by: PHYSICIAN ASSISTANT

## 2021-01-19 ENCOUNTER — OFFICE VISIT (OUTPATIENT)
Dept: FAMILY MEDICINE | Facility: CLINIC | Age: 41
End: 2021-01-19
Payer: COMMERCIAL

## 2021-01-19 VITALS
DIASTOLIC BLOOD PRESSURE: 74 MMHG | SYSTOLIC BLOOD PRESSURE: 122 MMHG | HEIGHT: 70 IN | HEART RATE: 80 BPM | BODY MASS INDEX: 27.2 KG/M2 | WEIGHT: 190 LBS

## 2021-01-19 DIAGNOSIS — Z51.81 ENCOUNTER FOR THERAPEUTIC DRUG MONITORING: ICD-10-CM

## 2021-01-19 DIAGNOSIS — Z79.899 ENCOUNTER FOR LONG-TERM (CURRENT) USE OF OTHER MEDICATIONS: ICD-10-CM

## 2021-01-19 DIAGNOSIS — F41.9 ANXIETY: Primary | ICD-10-CM

## 2021-01-19 PROCEDURE — 3008F PR BODY MASS INDEX (BMI) DOCUMENTED: ICD-10-PCS | Mod: S$GLB,,, | Performed by: PHYSICIAN ASSISTANT

## 2021-01-19 PROCEDURE — 3008F BODY MASS INDEX DOCD: CPT | Mod: S$GLB,,, | Performed by: PHYSICIAN ASSISTANT

## 2021-01-19 PROCEDURE — 99213 PR OFFICE/OUTPT VISIT, EST, LEVL III, 20-29 MIN: ICD-10-PCS | Mod: S$GLB,,, | Performed by: PHYSICIAN ASSISTANT

## 2021-01-19 PROCEDURE — 99213 OFFICE O/P EST LOW 20 MIN: CPT | Mod: S$GLB,,, | Performed by: PHYSICIAN ASSISTANT

## 2021-01-19 RX ORDER — ALPRAZOLAM 0.5 MG/1
0.5 TABLET ORAL 2 TIMES DAILY PRN
Qty: 60 TABLET | Refills: 1 | Status: SHIPPED | OUTPATIENT
Start: 2021-01-19 | End: 2021-05-08 | Stop reason: SDUPTHER

## 2021-01-28 ENCOUNTER — PATIENT MESSAGE (OUTPATIENT)
Dept: FAMILY MEDICINE | Facility: CLINIC | Age: 41
End: 2021-01-28

## 2021-04-23 ENCOUNTER — TELEPHONE (OUTPATIENT)
Dept: FAMILY MEDICINE | Facility: CLINIC | Age: 41
End: 2021-04-23

## 2021-04-23 DIAGNOSIS — Z79.899 ENCOUNTER FOR LONG-TERM (CURRENT) USE OF OTHER MEDICATIONS: Primary | ICD-10-CM

## 2021-04-29 ENCOUNTER — PATIENT MESSAGE (OUTPATIENT)
Dept: RESEARCH | Facility: HOSPITAL | Age: 41
End: 2021-04-29

## 2021-05-10 DIAGNOSIS — F41.9 ANXIETY: ICD-10-CM

## 2021-05-11 RX ORDER — ALPRAZOLAM 0.5 MG/1
0.5 TABLET ORAL 2 TIMES DAILY PRN
Qty: 60 TABLET | Refills: 1 | Status: SHIPPED | OUTPATIENT
Start: 2021-05-11 | End: 2021-07-16 | Stop reason: SDUPTHER

## 2021-05-18 ENCOUNTER — OFFICE VISIT (OUTPATIENT)
Dept: FAMILY MEDICINE | Facility: CLINIC | Age: 41
End: 2021-05-18
Payer: COMMERCIAL

## 2021-05-18 ENCOUNTER — TELEPHONE (OUTPATIENT)
Dept: FAMILY MEDICINE | Facility: CLINIC | Age: 41
End: 2021-05-18

## 2021-05-18 VITALS
HEIGHT: 70 IN | DIASTOLIC BLOOD PRESSURE: 84 MMHG | BODY MASS INDEX: 26.34 KG/M2 | SYSTOLIC BLOOD PRESSURE: 138 MMHG | WEIGHT: 184 LBS | HEART RATE: 57 BPM | OXYGEN SATURATION: 98 %

## 2021-05-18 DIAGNOSIS — F33.1 MAJOR DEPRESSIVE DISORDER, RECURRENT, MODERATE: Primary | ICD-10-CM

## 2021-05-18 PROCEDURE — 3008F BODY MASS INDEX DOCD: CPT | Mod: S$GLB,,, | Performed by: PHYSICIAN ASSISTANT

## 2021-05-18 PROCEDURE — 99214 PR OFFICE/OUTPT VISIT, EST, LEVL IV, 30-39 MIN: ICD-10-PCS | Mod: S$GLB,,, | Performed by: PHYSICIAN ASSISTANT

## 2021-05-18 PROCEDURE — 3008F PR BODY MASS INDEX (BMI) DOCUMENTED: ICD-10-PCS | Mod: S$GLB,,, | Performed by: PHYSICIAN ASSISTANT

## 2021-05-18 PROCEDURE — 99214 OFFICE O/P EST MOD 30 MIN: CPT | Mod: S$GLB,,, | Performed by: PHYSICIAN ASSISTANT

## 2021-05-18 RX ORDER — VORTIOXETINE 5 MG/1
5 TABLET, FILM COATED ORAL DAILY
Qty: 30 TABLET | Refills: 2 | Status: SHIPPED | OUTPATIENT
Start: 2021-05-18 | End: 2021-07-16

## 2021-05-26 ENCOUNTER — TELEPHONE (OUTPATIENT)
Dept: FAMILY MEDICINE | Facility: CLINIC | Age: 41
End: 2021-05-26

## 2021-07-16 ENCOUNTER — OFFICE VISIT (OUTPATIENT)
Dept: FAMILY MEDICINE | Facility: CLINIC | Age: 41
End: 2021-07-16
Payer: COMMERCIAL

## 2021-07-16 VITALS
BODY MASS INDEX: 26.34 KG/M2 | WEIGHT: 184 LBS | SYSTOLIC BLOOD PRESSURE: 132 MMHG | DIASTOLIC BLOOD PRESSURE: 76 MMHG | HEART RATE: 76 BPM | HEIGHT: 70 IN

## 2021-07-16 DIAGNOSIS — F33.1 MAJOR DEPRESSIVE DISORDER, RECURRENT, MODERATE: ICD-10-CM

## 2021-07-16 DIAGNOSIS — F41.9 ANXIETY: ICD-10-CM

## 2021-07-16 PROCEDURE — 3008F PR BODY MASS INDEX (BMI) DOCUMENTED: ICD-10-PCS | Mod: S$GLB,,, | Performed by: PHYSICIAN ASSISTANT

## 2021-07-16 PROCEDURE — 3008F BODY MASS INDEX DOCD: CPT | Mod: S$GLB,,, | Performed by: PHYSICIAN ASSISTANT

## 2021-07-16 PROCEDURE — 99213 OFFICE O/P EST LOW 20 MIN: CPT | Mod: S$GLB,,, | Performed by: PHYSICIAN ASSISTANT

## 2021-07-16 PROCEDURE — 99213 PR OFFICE/OUTPT VISIT, EST, LEVL III, 20-29 MIN: ICD-10-PCS | Mod: S$GLB,,, | Performed by: PHYSICIAN ASSISTANT

## 2021-07-16 RX ORDER — ALPRAZOLAM 0.5 MG/1
0.5 TABLET ORAL 2 TIMES DAILY PRN
Qty: 60 TABLET | Refills: 3 | Status: SHIPPED | OUTPATIENT
Start: 2021-07-16 | End: 2021-11-15 | Stop reason: SDUPTHER

## 2021-07-16 RX ORDER — VORTIOXETINE 10 MG/1
10 TABLET, FILM COATED ORAL DAILY
Qty: 30 TABLET | Refills: 5 | Status: SHIPPED | OUTPATIENT
Start: 2021-07-16 | End: 2021-08-17

## 2021-08-17 ENCOUNTER — PATIENT MESSAGE (OUTPATIENT)
Dept: FAMILY MEDICINE | Facility: CLINIC | Age: 41
End: 2021-08-17

## 2021-08-17 DIAGNOSIS — F33.1 MAJOR DEPRESSIVE DISORDER, RECURRENT, MODERATE: ICD-10-CM

## 2021-08-17 RX ORDER — VORTIOXETINE 5 MG/1
5 TABLET, FILM COATED ORAL DAILY
Qty: 30 TABLET | Refills: 5 | Status: SHIPPED | OUTPATIENT
Start: 2021-08-17 | End: 2022-01-24 | Stop reason: SDUPTHER

## 2022-01-13 ENCOUNTER — TELEPHONE (OUTPATIENT)
Dept: FAMILY MEDICINE | Facility: CLINIC | Age: 42
End: 2022-01-13
Payer: COMMERCIAL

## 2022-01-24 ENCOUNTER — OFFICE VISIT (OUTPATIENT)
Dept: FAMILY MEDICINE | Facility: CLINIC | Age: 42
End: 2022-01-24
Payer: COMMERCIAL

## 2022-01-24 VITALS
HEART RATE: 73 BPM | SYSTOLIC BLOOD PRESSURE: 124 MMHG | HEIGHT: 70 IN | BODY MASS INDEX: 27.2 KG/M2 | OXYGEN SATURATION: 99 % | DIASTOLIC BLOOD PRESSURE: 84 MMHG | WEIGHT: 190 LBS

## 2022-01-24 DIAGNOSIS — Z79.899 ENCOUNTER FOR LONG-TERM (CURRENT) USE OF OTHER MEDICATIONS: ICD-10-CM

## 2022-01-24 DIAGNOSIS — Z51.81 ENCOUNTER FOR THERAPEUTIC DRUG MONITORING: ICD-10-CM

## 2022-01-24 DIAGNOSIS — F33.1 MAJOR DEPRESSIVE DISORDER, RECURRENT, MODERATE: Primary | ICD-10-CM

## 2022-01-24 PROCEDURE — 3008F BODY MASS INDEX DOCD: CPT | Mod: S$GLB,,, | Performed by: PHYSICIAN ASSISTANT

## 2022-01-24 PROCEDURE — 99395 PR PREVENTIVE VISIT,EST,18-39: ICD-10-PCS | Mod: S$GLB,,, | Performed by: PHYSICIAN ASSISTANT

## 2022-01-24 PROCEDURE — 3008F PR BODY MASS INDEX (BMI) DOCUMENTED: ICD-10-PCS | Mod: S$GLB,,, | Performed by: PHYSICIAN ASSISTANT

## 2022-01-24 PROCEDURE — 99395 PREV VISIT EST AGE 18-39: CPT | Mod: S$GLB,,, | Performed by: PHYSICIAN ASSISTANT

## 2022-01-24 RX ORDER — VORTIOXETINE 5 MG/1
5 TABLET, FILM COATED ORAL DAILY
Qty: 30 TABLET | Refills: 5 | Status: SHIPPED | OUTPATIENT
Start: 2022-01-24 | End: 2022-07-23

## 2022-01-24 NOTE — PROGRESS NOTES
SUBJECTIVE:    Patient ID: Al Romero is a 41 y.o. male.    Chief Complaint: Annual Exam (Annual wellness exam//brought med bottles//declined flu vac//last dose xanax this morning//no refills needed per pt//tc)    This is a 41-year-old male who presents today for annual wellness exam.  Has blood work to be completed.  Treated for anxiety.  Maintains with Trintellix low-dose 5 mg and alprazolam strictly p.r.n. for severe anxiety and panic. Reports that he has been doing well. Requiring usually just one alprazolam in the mornings with the trintellix.      No visits with results within 6 Month(s) from this visit.   Latest known visit with results is:   Clinical Support on 12/18/2020   Component Date Value Ref Range Status    POC Rapid COVID 12/18/2020 Negative  Negative Final     Acceptable 12/18/2020 Yes   Final       Past Medical History:   Diagnosis Date    Anxiety      No past surgical history on file.  No family history on file.    Marital Status:   Alcohol History:  reports current alcohol use.  Tobacco History:  reports that he has been smoking cigarettes and vaping with nicotine. He has never used smokeless tobacco.  Drug History:  reports no history of drug use.    Review of patient's allergies indicates:  No Known Allergies    Current Outpatient Medications:     ALPRAZolam (XANAX) 0.5 MG tablet, Take 1 tablet (0.5 mg total) by mouth 2 (two) times daily as needed for Anxiety., Disp: 60 tablet, Rfl: 3    vortioxetine (TRINTELLIX) 5 mg Tab, Take 1 tablet (5 mg total) by mouth once daily., Disp: 30 tablet, Rfl: 5    Review of Systems   Constitutional: Negative for activity change, fatigue, fever and unexpected weight change.   HENT: Negative for congestion.    Respiratory: Negative for apnea, cough, chest tightness and shortness of breath.    Cardiovascular: Negative for chest pain and palpitations.   Gastrointestinal: Negative for abdominal distention and abdominal pain.  "  Genitourinary: Negative for difficulty urinating and dysuria.   Musculoskeletal: Negative for arthralgias and back pain.   Neurological: Negative for dizziness and weakness.          Objective:      Vitals:    01/24/22 1521   BP: 124/84   Pulse: 73   SpO2: 99%   Weight: 86.2 kg (190 lb)   Height: 5' 10" (1.778 m)     Physical Exam  Constitutional:       General: He is not in acute distress.     Appearance: He is well-developed and well-nourished.   HENT:      Head: Normocephalic and atraumatic.   Eyes:      Pupils: Pupils are equal, round, and reactive to light.   Neck:      Thyroid: No thyromegaly.   Cardiovascular:      Rate and Rhythm: Normal rate and regular rhythm.      Pulses: Intact distal pulses.      Heart sounds: Normal heart sounds.   Pulmonary:      Effort: Pulmonary effort is normal.      Breath sounds: Normal breath sounds.   Abdominal:      General: Bowel sounds are normal. There is no distension.      Palpations: Abdomen is soft.      Tenderness: There is no abdominal tenderness.   Musculoskeletal:         General: Normal range of motion.      Cervical back: Normal range of motion and neck supple.   Skin:     General: Skin is warm and dry.      Findings: No erythema or rash.   Neurological:      Mental Status: He is alert and oriented to person, place, and time.      Cranial Nerves: No cranial nerve deficit.           Assessment:       1. Major depressive disorder, recurrent, moderate    2. Encounter for long-term (current) use of other medications    3. Encounter for therapeutic drug monitoring         Plan:       Major depressive disorder, recurrent, moderate  Comments:  Doing better with trintellix. maintain current script as is. Lowest effective dose.  Orders:  -     vortioxetine (TRINTELLIX) 5 mg Tab; Take 1 tablet (5 mg total) by mouth once daily.  Dispense: 30 tablet; Refill: 5    Encounter for long-term (current) use of other medications  -     DRUG MONITOR, PANEL 4, W/CONF, URINE; Future; " Expected date: 01/24/2022    Encounter for therapeutic drug monitoring  -     DRUG MONITOR, PANEL 4, W/CONF, URINE; Future; Expected date: 01/24/2022      Follow up in about 6 months (around 7/24/2022) for checkup.        1/24/2022 Devaughn Banegas PA-C

## 2022-01-30 LAB
ALBUMIN SERPL-MCNC: 4.5 G/DL (ref 3.6–5.1)
ALBUMIN/GLOB SERPL: 1.6 (CALC) (ref 1–2.5)
ALP SERPL-CCNC: 94 U/L (ref 36–130)
ALT SERPL-CCNC: 21 U/L (ref 9–46)
APPEARANCE UR: CLEAR
AST SERPL-CCNC: 14 U/L (ref 10–40)
BACTERIA #/AREA URNS HPF: NORMAL /HPF
BACTERIA UR CULT: NORMAL
BASOPHILS # BLD AUTO: 117 CELLS/UL (ref 0–200)
BASOPHILS NFR BLD AUTO: 1.1 %
BILIRUB SERPL-MCNC: 0.6 MG/DL (ref 0.2–1.2)
BILIRUB UR QL STRIP: NEGATIVE
BUN SERPL-MCNC: 20 MG/DL (ref 7–25)
BUN/CREAT SERPL: NORMAL (CALC) (ref 6–22)
CALCIUM SERPL-MCNC: 9.6 MG/DL (ref 8.6–10.3)
CHLORIDE SERPL-SCNC: 106 MMOL/L (ref 98–110)
CHOLEST SERPL-MCNC: 166 MG/DL
CHOLEST/HDLC SERPL: 2.5 (CALC)
CO2 SERPL-SCNC: 29 MMOL/L (ref 20–32)
COLOR UR: YELLOW
CREAT SERPL-MCNC: 0.96 MG/DL (ref 0.6–1.35)
EOSINOPHIL # BLD AUTO: 519 CELLS/UL (ref 15–500)
EOSINOPHIL NFR BLD AUTO: 4.9 %
ERYTHROCYTE [DISTWIDTH] IN BLOOD BY AUTOMATED COUNT: 12.4 % (ref 11–15)
GLOBULIN SER CALC-MCNC: 2.9 G/DL (CALC) (ref 1.9–3.7)
GLUCOSE SERPL-MCNC: 98 MG/DL (ref 65–99)
GLUCOSE UR QL STRIP: NEGATIVE
HCT VFR BLD AUTO: 45.6 % (ref 38.5–50)
HDLC SERPL-MCNC: 66 MG/DL
HGB BLD-MCNC: 15.6 G/DL (ref 13.2–17.1)
HGB UR QL STRIP: NEGATIVE
HYALINE CASTS #/AREA URNS LPF: NORMAL /LPF
KETONES UR QL STRIP: NEGATIVE
LDLC SERPL CALC-MCNC: 86 MG/DL (CALC)
LEUKOCYTE ESTERASE UR QL STRIP: NEGATIVE
LYMPHOCYTES # BLD AUTO: 1887 CELLS/UL (ref 850–3900)
LYMPHOCYTES NFR BLD AUTO: 17.8 %
MCH RBC QN AUTO: 32.2 PG (ref 27–33)
MCHC RBC AUTO-ENTMCNC: 34.2 G/DL (ref 32–36)
MCV RBC AUTO: 94.2 FL (ref 80–100)
MONOCYTES # BLD AUTO: 1219 CELLS/UL (ref 200–950)
MONOCYTES NFR BLD AUTO: 11.5 %
NEUTROPHILS # BLD AUTO: 6858 CELLS/UL (ref 1500–7800)
NEUTROPHILS NFR BLD AUTO: 64.7 %
NITRITE UR QL STRIP: NEGATIVE
NONHDLC SERPL-MCNC: 100 MG/DL (CALC)
PH UR STRIP: 5.5 [PH] (ref 5–8)
PLATELET # BLD AUTO: 241 THOUSAND/UL (ref 140–400)
PMV BLD REES-ECKER: 10.9 FL (ref 7.5–12.5)
POTASSIUM SERPL-SCNC: 4.9 MMOL/L (ref 3.5–5.3)
PROT SERPL-MCNC: 7.4 G/DL (ref 6.1–8.1)
PROT UR QL STRIP: NEGATIVE
RBC # BLD AUTO: 4.84 MILLION/UL (ref 4.2–5.8)
RBC #/AREA URNS HPF: NORMAL /HPF
SODIUM SERPL-SCNC: 139 MMOL/L (ref 135–146)
SP GR UR STRIP: 1.03 (ref 1–1.03)
SQUAMOUS #/AREA URNS HPF: NORMAL /HPF
TRIGL SERPL-MCNC: 59 MG/DL
TSH SERPL-ACNC: 1.47 MIU/L (ref 0.4–4.5)
WBC # BLD AUTO: 10.6 THOUSAND/UL (ref 3.8–10.8)
WBC #/AREA URNS HPF: NORMAL /HPF

## 2022-02-01 LAB
1OH-MIDAZOLAM UR-MCNC: NEGATIVE NG/ML
7AMINOCLONAZEPAM UR-MCNC: NEGATIVE NG/ML
A-OH ALPRAZ UR-MCNC: 244 NG/ML
A-OH-TRIAZOLAM UR-MCNC: NEGATIVE NG/ML
AMPHETAMINES UR QL: NEGATIVE NG/ML
BARBITURATES UR QL: NEGATIVE NG/ML
BENZODIAZ UR QL: POSITIVE NG/ML
BZE UR QL: NEGATIVE NG/ML
CREAT UR-MCNC: 203.9 MG/DL
DRUG SCREEN COMMENT UR-IMP: ABNORMAL
LORAZEPAM UR-MCNC: NEGATIVE NG/ML
METHADONE UR QL: NEGATIVE NG/ML
NORDIAZEPAM UR-MCNC: NEGATIVE NG/ML
NOTES AND COMMENTS: ABNORMAL
OH-ETHYLFLURAZ UR-MCNC: NEGATIVE NG/ML
OPIATES UR QL: NEGATIVE NG/ML
OXAZEPAM UR-MCNC: NEGATIVE NG/ML
OXIDANTS UR QL: NEGATIVE MCG/ML
OXYCODONE UR QL: NEGATIVE NG/ML
PCP UR QL: NEGATIVE NG/ML
PH UR: 5.7 [PH] (ref 4.5–9)
TEMAZEPAM UR-MCNC: NEGATIVE NG/ML

## 2022-02-14 ENCOUNTER — PATIENT MESSAGE (OUTPATIENT)
Dept: FAMILY MEDICINE | Facility: CLINIC | Age: 42
End: 2022-02-14
Payer: COMMERCIAL

## 2022-05-05 ENCOUNTER — TELEPHONE (OUTPATIENT)
Dept: FAMILY MEDICINE | Facility: CLINIC | Age: 42
End: 2022-05-05

## 2022-08-01 ENCOUNTER — OFFICE VISIT (OUTPATIENT)
Dept: FAMILY MEDICINE | Facility: CLINIC | Age: 42
End: 2022-08-01
Payer: COMMERCIAL

## 2022-08-01 VITALS
HEART RATE: 73 BPM | BODY MASS INDEX: 26.92 KG/M2 | WEIGHT: 188 LBS | OXYGEN SATURATION: 99 % | HEIGHT: 70 IN | DIASTOLIC BLOOD PRESSURE: 100 MMHG | SYSTOLIC BLOOD PRESSURE: 140 MMHG

## 2022-08-01 DIAGNOSIS — I10 ESSENTIAL HYPERTENSION: ICD-10-CM

## 2022-08-01 DIAGNOSIS — G47.33 OSA (OBSTRUCTIVE SLEEP APNEA): ICD-10-CM

## 2022-08-01 DIAGNOSIS — F41.9 ANXIETY: ICD-10-CM

## 2022-08-01 DIAGNOSIS — F33.1 MAJOR DEPRESSIVE DISORDER, RECURRENT, MODERATE: Primary | ICD-10-CM

## 2022-08-01 PROCEDURE — 3008F BODY MASS INDEX DOCD: CPT | Mod: CPTII,S$GLB,, | Performed by: PHYSICIAN ASSISTANT

## 2022-08-01 PROCEDURE — 3008F PR BODY MASS INDEX (BMI) DOCUMENTED: ICD-10-PCS | Mod: CPTII,S$GLB,, | Performed by: PHYSICIAN ASSISTANT

## 2022-08-01 PROCEDURE — 3077F SYST BP >= 140 MM HG: CPT | Mod: CPTII,S$GLB,, | Performed by: PHYSICIAN ASSISTANT

## 2022-08-01 PROCEDURE — 3080F PR MOST RECENT DIASTOLIC BLOOD PRESSURE >= 90 MM HG: ICD-10-PCS | Mod: CPTII,S$GLB,, | Performed by: PHYSICIAN ASSISTANT

## 2022-08-01 PROCEDURE — 99214 OFFICE O/P EST MOD 30 MIN: CPT | Mod: S$GLB,,, | Performed by: PHYSICIAN ASSISTANT

## 2022-08-01 PROCEDURE — 99214 PR OFFICE/OUTPT VISIT, EST, LEVL IV, 30-39 MIN: ICD-10-PCS | Mod: S$GLB,,, | Performed by: PHYSICIAN ASSISTANT

## 2022-08-01 PROCEDURE — 3077F PR MOST RECENT SYSTOLIC BLOOD PRESSURE >= 140 MM HG: ICD-10-PCS | Mod: CPTII,S$GLB,, | Performed by: PHYSICIAN ASSISTANT

## 2022-08-01 PROCEDURE — 3080F DIAST BP >= 90 MM HG: CPT | Mod: CPTII,S$GLB,, | Performed by: PHYSICIAN ASSISTANT

## 2022-08-01 RX ORDER — ALPRAZOLAM 0.5 MG/1
0.5 TABLET ORAL 2 TIMES DAILY PRN
Qty: 60 TABLET | Refills: 3 | Status: SHIPPED | OUTPATIENT
Start: 2022-08-01 | End: 2023-06-19 | Stop reason: SDUPTHER

## 2022-08-01 RX ORDER — VORTIOXETINE 5 MG/1
5 TABLET, FILM COATED ORAL DAILY
Qty: 30 TABLET | Refills: 5 | Status: CANCELLED | OUTPATIENT
Start: 2022-08-01

## 2022-08-01 RX ORDER — VORTIOXETINE 10 MG/1
10 TABLET, FILM COATED ORAL DAILY
Qty: 30 TABLET | Refills: 2 | Status: SHIPPED | OUTPATIENT
Start: 2022-08-01 | End: 2022-10-30

## 2022-08-01 RX ORDER — LISINOPRIL 10 MG/1
10 TABLET ORAL DAILY
Qty: 30 TABLET | Refills: 2 | Status: SHIPPED | OUTPATIENT
Start: 2022-08-01 | End: 2022-09-06 | Stop reason: SDUPTHER

## 2022-08-01 NOTE — PROGRESS NOTES
SUBJECTIVE:    Patient ID: Al Romero is a 41 y.o. male.    Chief Complaint: Depression (Follow up / no bottles / TA)    This is a 41-year-old male who presents today for 6 month follow-up regarding anxiety and depression.  Full labs were completed in January.  Maintains on Trintellix low-dose and alprazolam p.r.n. Reports that he still feels that the medication is causing him to be somewhat drowsy. He finds that he is using about 3, 5 hour energy drinks throughout the day. Energy just not where he would like.      No visits with results within 6 Month(s) from this visit.   Latest known visit with results is:   Office Visit on 01/24/2022   Component Date Value Ref Range Status    Amphetamines 01/29/2022 NEGATIVE  <500 ng/mL Final    Barbiturates 01/29/2022 NEGATIVE  <300 ng/mL Final    Benzodiazepines 01/29/2022 POSITIVE (A) <100 ng/mL Corrected    Benzodiazepines 01/29/2022 => REVISED: Change in test result(s)   Corrected    Alphahydroxyalprazolam 01/29/2022 244 (A) <25 ng/mL Corrected    Alprazolam 01/29/2022 => REVISED: Change in test result(s)   Corrected    Alphahydroxymidazolam 01/29/2022 NEGATIVE  <50 ng/mL Corrected    MEDMATCH AOH MIDAZOLAM 01/29/2022 => REVISED: Change in test result(s)   Corrected    Alphahydroxytriazolam 01/29/2022 NEGATIVE  <50 ng/mL Corrected    Triazolam 01/29/2022 => REVISED: Change in test result(s)   Corrected    Aminoclonazepam 01/29/2022 NEGATIVE  <25 ng/mL Corrected    MEDMATCH AMINOCLONAZEPAM 01/29/2022 => REVISED: Change in test result(s)   Corrected    hydroxyethylflurazepam UR GC/MS 01/29/2022 NEGATIVE  <50 ng/mL Corrected    MEDMATCH [place], ET FLURAZEPAM 01/29/2022 => REVISED: Change in test result(s)   Corrected    Lorazepam 01/29/2022 NEGATIVE  <50 ng/mL Corrected    Lorazepam 01/29/2022 => REVISED: Change in test result(s)   Corrected    Nordiazepam Lvl 01/29/2022 NEGATIVE  <50 ng/mL Corrected    Nordiazepam Lvl 01/29/2022 => REVISED: Change in test  result(s)   Corrected    Oxazepam 01/29/2022 NEGATIVE  <50 ng/mL Corrected    Oxazepam 01/29/2022 => REVISED: Change in test result(s)   Corrected    Temazepam GC/MS Conf 01/29/2022 NEGATIVE  <50 ng/mL Corrected    Temazepam GC/MS Conf 01/29/2022 => REVISED: Change in test result(s)   Corrected    Benzodiazepines Comments 01/29/2022 See Benzodiazepines Notes, LDT Notes => REVISED: Change in test result(s)   Corrected    Cocaine Metabolites 01/29/2022 NEGATIVE  <150 ng/mL Final    Methadone 01/29/2022 NEGATIVE  <100 ng/mL Final    Opiates 01/29/2022 NEGATIVE  <100 ng/mL Final    Oxycodone 01/29/2022 NEGATIVE  <100 ng/mL Final    Phencyclidine 01/29/2022 NEGATIVE  <25 ng/mL Final    Creatinine 01/29/2022 203.9  > or = 20.0 mg/dL Final    pH 01/29/2022 5.7  4.5 - 9.0 Final    Oxidants, Urine (Tox) 01/29/2022 NEGATIVE  <200 mcg/mL Final    Notes and Comments 01/29/2022    Final       Past Medical History:   Diagnosis Date    Anxiety      History reviewed. No pertinent surgical history.  History reviewed. No pertinent family history.    Marital Status:   Alcohol History:  reports current alcohol use.  Tobacco History:  reports that he has been smoking cigarettes and vaping with nicotine. He has never used smokeless tobacco.  Drug History:  reports no history of drug use.    Review of patient's allergies indicates:  No Known Allergies    Current Outpatient Medications:     ALPRAZolam (XANAX) 0.5 MG tablet, Take 1 tablet (0.5 mg total) by mouth 2 (two) times daily as needed for Anxiety., Disp: 60 tablet, Rfl: 3    lisinopriL 10 MG tablet, Take 1 tablet (10 mg total) by mouth once daily., Disp: 30 tablet, Rfl: 2    vortioxetine (TRINTELLIX) 10 mg Tab, Take 1 tablet (10 mg total) by mouth once daily at 6am., Disp: 30 tablet, Rfl: 2    Review of Systems   Constitutional: Negative for activity change, fatigue, fever and unexpected weight change.   HENT: Negative for congestion.    Respiratory: Negative  "for apnea, cough, chest tightness and shortness of breath.    Cardiovascular: Negative for chest pain and palpitations.   Gastrointestinal: Negative for abdominal distention and abdominal pain.   Genitourinary: Negative for difficulty urinating and dysuria.   Musculoskeletal: Negative for arthralgias and back pain.   Neurological: Negative for dizziness and weakness.   Psychiatric/Behavioral: Positive for decreased concentration and dysphoric mood. The patient is nervous/anxious.           Objective:      Vitals:    08/01/22 1526 08/01/22 1530   BP: (!) 138/98 (!) 140/100   Pulse: 73    SpO2: 99%    Weight: 85.3 kg (188 lb)    Height: 5' 10" (1.778 m)      Physical Exam  Constitutional:       General: He is not in acute distress.     Appearance: He is well-developed.   HENT:      Head: Normocephalic and atraumatic.   Eyes:      Pupils: Pupils are equal, round, and reactive to light.   Neck:      Thyroid: No thyromegaly.   Cardiovascular:      Rate and Rhythm: Normal rate and regular rhythm.      Heart sounds: Normal heart sounds.   Pulmonary:      Effort: Pulmonary effort is normal.      Breath sounds: Normal breath sounds.   Abdominal:      General: Bowel sounds are normal. There is no distension.      Palpations: Abdomen is soft.      Tenderness: There is no abdominal tenderness.   Musculoskeletal:         General: Normal range of motion.      Cervical back: Normal range of motion and neck supple.   Skin:     General: Skin is warm and dry.      Findings: No erythema or rash.   Neurological:      Mental Status: He is alert and oriented to person, place, and time.      Cranial Nerves: No cranial nerve deficit.           Assessment:       1. Major depressive disorder, recurrent, moderate    2. Anxiety    3. Essential hypertension    4. CHIQUITA (obstructive sleep apnea)         Plan:       Major depressive disorder, recurrent, moderate    Anxiety  Comments:  Doing fairly well, has been taking equivalent of 1mg BID. Will " refill. Stress with  and kids at home. Lowering dose now to taper and use strictly prn.  Orders:  -     ALPRAZolam (XANAX) 0.5 MG tablet; Take 1 tablet (0.5 mg total) by mouth 2 (two) times daily as needed for Anxiety.  Dispense: 60 tablet; Refill: 3  -     vortioxetine (TRINTELLIX) 10 mg Tab; Take 1 tablet (10 mg total) by mouth once daily at 6am.  Dispense: 30 tablet; Refill: 2    Essential hypertension  Comments:  pressure running high. Suspect that this may have something to do with his tiredness and fatigue. we will go ahead and add lisinopril now. Plan to f/u in 1 cynthia  Orders:  -     lisinopriL 10 MG tablet; Take 1 tablet (10 mg total) by mouth once daily.  Dispense: 30 tablet; Refill: 2    CHIQUITA (obstructive sleep apnea)  Comments:  discussed cpap device and compliance. could be affecting energy, BP etc.      Follow up in about 6 weeks (around 2022) for BP Check-Up.        2022 Devaughn Banegas PA-C

## 2022-09-06 ENCOUNTER — OFFICE VISIT (OUTPATIENT)
Dept: FAMILY MEDICINE | Facility: CLINIC | Age: 42
End: 2022-09-06
Payer: COMMERCIAL

## 2022-09-06 VITALS
DIASTOLIC BLOOD PRESSURE: 78 MMHG | HEART RATE: 66 BPM | BODY MASS INDEX: 26.92 KG/M2 | HEIGHT: 70 IN | SYSTOLIC BLOOD PRESSURE: 128 MMHG | WEIGHT: 188 LBS

## 2022-09-06 DIAGNOSIS — M25.561 CHRONIC PAIN OF BOTH KNEES: ICD-10-CM

## 2022-09-06 DIAGNOSIS — G89.29 CHRONIC PAIN OF BOTH KNEES: ICD-10-CM

## 2022-09-06 DIAGNOSIS — I10 ESSENTIAL HYPERTENSION: Primary | ICD-10-CM

## 2022-09-06 DIAGNOSIS — M54.50 LUMBAR BACK PAIN: ICD-10-CM

## 2022-09-06 DIAGNOSIS — M25.562 CHRONIC PAIN OF BOTH KNEES: ICD-10-CM

## 2022-09-06 PROCEDURE — 4010F PR ACE/ARB THEARPY RXD/TAKEN: ICD-10-PCS | Mod: CPTII,S$GLB,, | Performed by: PHYSICIAN ASSISTANT

## 2022-09-06 PROCEDURE — 3074F PR MOST RECENT SYSTOLIC BLOOD PRESSURE < 130 MM HG: ICD-10-PCS | Mod: CPTII,S$GLB,, | Performed by: PHYSICIAN ASSISTANT

## 2022-09-06 PROCEDURE — 3008F BODY MASS INDEX DOCD: CPT | Mod: CPTII,S$GLB,, | Performed by: PHYSICIAN ASSISTANT

## 2022-09-06 PROCEDURE — 4010F ACE/ARB THERAPY RXD/TAKEN: CPT | Mod: CPTII,S$GLB,, | Performed by: PHYSICIAN ASSISTANT

## 2022-09-06 PROCEDURE — 1159F PR MEDICATION LIST DOCUMENTED IN MEDICAL RECORD: ICD-10-PCS | Mod: CPTII,S$GLB,, | Performed by: PHYSICIAN ASSISTANT

## 2022-09-06 PROCEDURE — 99214 PR OFFICE/OUTPT VISIT, EST, LEVL IV, 30-39 MIN: ICD-10-PCS | Mod: S$GLB,,, | Performed by: PHYSICIAN ASSISTANT

## 2022-09-06 PROCEDURE — 3074F SYST BP LT 130 MM HG: CPT | Mod: CPTII,S$GLB,, | Performed by: PHYSICIAN ASSISTANT

## 2022-09-06 PROCEDURE — 3078F PR MOST RECENT DIASTOLIC BLOOD PRESSURE < 80 MM HG: ICD-10-PCS | Mod: CPTII,S$GLB,, | Performed by: PHYSICIAN ASSISTANT

## 2022-09-06 PROCEDURE — 3008F PR BODY MASS INDEX (BMI) DOCUMENTED: ICD-10-PCS | Mod: CPTII,S$GLB,, | Performed by: PHYSICIAN ASSISTANT

## 2022-09-06 PROCEDURE — 3078F DIAST BP <80 MM HG: CPT | Mod: CPTII,S$GLB,, | Performed by: PHYSICIAN ASSISTANT

## 2022-09-06 PROCEDURE — 99214 OFFICE O/P EST MOD 30 MIN: CPT | Mod: S$GLB,,, | Performed by: PHYSICIAN ASSISTANT

## 2022-09-06 PROCEDURE — 1159F MED LIST DOCD IN RCRD: CPT | Mod: CPTII,S$GLB,, | Performed by: PHYSICIAN ASSISTANT

## 2022-09-06 RX ORDER — LISINOPRIL 10 MG/1
10 TABLET ORAL DAILY
Qty: 90 TABLET | Refills: 1 | Status: SHIPPED | OUTPATIENT
Start: 2022-09-06 | End: 2022-11-03 | Stop reason: SDUPTHER

## 2022-09-06 NOTE — PROGRESS NOTES
SUBJECTIVE:    Patient ID: Al Romero is a 42 y.o. male.    Chief Complaint: Hypertension (Went over meds verbally// SW)    This is a 43 yo male who presents today for checkup and refills. Reports that he has been doing well with the addition of the lisinopril for pressure. Better readings here and at home. Highest reading he has registered is 140 systolic. Feels pretty well. Got covid last month and then now just getting over a sinus infection. He does report continued knee pain worsening. Bilateral. Very physical job over the years. Back bothersome also for him.Chiropractic efforts not helping too much.       No visits with results within 6 Month(s) from this visit.   Latest known visit with results is:   Office Visit on 01/24/2022   Component Date Value Ref Range Status    Amphetamines 01/29/2022 NEGATIVE  <500 ng/mL Final    Barbiturates 01/29/2022 NEGATIVE  <300 ng/mL Final    Benzodiazepines 01/29/2022 POSITIVE (A)  <100 ng/mL Corrected    Benzodiazepines 01/29/2022 => REVISED: Change in test result(s)   Corrected    Alphahydroxyalprazolam 01/29/2022 244 (H)  <25 ng/mL Corrected    Alprazolam 01/29/2022 => REVISED: Change in test result(s)   Corrected    Alphahydroxymidazolam 01/29/2022 NEGATIVE  <50 ng/mL Corrected    MEDMATCH AOH MIDAZOLAM 01/29/2022 => REVISED: Change in test result(s)   Corrected    Alphahydroxytriazolam 01/29/2022 NEGATIVE  <50 ng/mL Corrected    Triazolam 01/29/2022 => REVISED: Change in test result(s)   Corrected    Aminoclonazepam 01/29/2022 NEGATIVE  <25 ng/mL Corrected    MEDMATCH AMINOCLONAZEPAM 01/29/2022 => REVISED: Change in test result(s)   Corrected    hydroxyethylflurazepam UR GC/MS 01/29/2022 NEGATIVE  <50 ng/mL Corrected    MEDMATCH [place], ET FLURAZEPAM 01/29/2022 => REVISED: Change in test result(s)   Corrected    Lorazepam 01/29/2022 NEGATIVE  <50 ng/mL Corrected    Lorazepam 01/29/2022 => REVISED: Change in test result(s)   Corrected    Nordiazepam Lvl 01/29/2022  NEGATIVE  <50 ng/mL Corrected    Nordiazepam Lvl 01/29/2022 => REVISED: Change in test result(s)   Corrected    Oxazepam 01/29/2022 NEGATIVE  <50 ng/mL Corrected    Oxazepam 01/29/2022 => REVISED: Change in test result(s)   Corrected    Temazepam GC/MS Conf 01/29/2022 NEGATIVE  <50 ng/mL Corrected    Temazepam GC/MS Conf 01/29/2022 => REVISED: Change in test result(s)   Corrected    Benzodiazepines Comments 01/29/2022 See Benzodiazepines Notes, LDT Notes => REVISED: Change in test result(s)   Corrected    Cocaine Metabolites 01/29/2022 NEGATIVE  <150 ng/mL Final    Methadone 01/29/2022 NEGATIVE  <100 ng/mL Final    Opiates 01/29/2022 NEGATIVE  <100 ng/mL Final    Oxycodone 01/29/2022 NEGATIVE  <100 ng/mL Final    Phencyclidine 01/29/2022 NEGATIVE  <25 ng/mL Final    Creatinine 01/29/2022 203.9  > or = 20.0 mg/dL Final    pH 01/29/2022 5.7  4.5 - 9.0 Final    Oxidants, Urine (Tox) 01/29/2022 NEGATIVE  <200 mcg/mL Final    Notes and Comments 01/29/2022    Final       Past Medical History:   Diagnosis Date    Anxiety      History reviewed. No pertinent surgical history.  History reviewed. No pertinent family history.    Marital Status:   Alcohol History:  reports current alcohol use.  Tobacco History:  reports that he has been smoking cigarettes and vaping with nicotine. He has never used smokeless tobacco.  Drug History:  reports no history of drug use.    Review of patient's allergies indicates:  No Known Allergies    Current Outpatient Medications:     ALPRAZolam (XANAX) 0.5 MG tablet, Take 1 tablet (0.5 mg total) by mouth 2 (two) times daily as needed for Anxiety., Disp: 60 tablet, Rfl: 3    vortioxetine (TRINTELLIX) 10 mg Tab, Take 1 tablet (10 mg total) by mouth once daily at 6am., Disp: 30 tablet, Rfl: 2    lisinopriL 10 MG tablet, Take 1 tablet (10 mg total) by mouth once daily., Disp: 90 tablet, Rfl: 1    Review of Systems   Constitutional:  Negative for activity change, fatigue, fever and unexpected  "weight change.   HENT:  Negative for congestion.    Respiratory:  Negative for apnea, cough, chest tightness and shortness of breath.    Cardiovascular:  Negative for chest pain and palpitations.   Gastrointestinal:  Negative for abdominal distention and abdominal pain.   Genitourinary:  Negative for difficulty urinating and dysuria.   Musculoskeletal:  Positive for arthralgias and myalgias. Negative for back pain.   Neurological:  Negative for dizziness and weakness.        Objective:      Vitals:    09/06/22 1443 09/06/22 1507   BP: 136/74 128/78   Pulse: 68 66   Weight: 85.3 kg (188 lb)    Height: 5' 10" (1.778 m)      Physical Exam  Constitutional:       General: He is not in acute distress.     Appearance: He is well-developed.   HENT:      Head: Normocephalic and atraumatic.   Eyes:      Pupils: Pupils are equal, round, and reactive to light.   Neck:      Thyroid: No thyromegaly.   Cardiovascular:      Rate and Rhythm: Normal rate and regular rhythm.      Heart sounds: Normal heart sounds.   Pulmonary:      Effort: Pulmonary effort is normal.      Breath sounds: Normal breath sounds.   Abdominal:      General: Bowel sounds are normal. There is no distension.      Palpations: Abdomen is soft.      Tenderness: There is no abdominal tenderness.   Musculoskeletal:      Cervical back: Normal range of motion and neck supple.      Lumbar back: Spasms and tenderness present. Decreased range of motion. Negative right straight leg raise test and negative left straight leg raise test.      Right knee: Crepitus present. Normal alignment and normal meniscus.      Left knee: Crepitus present. Normal alignment and normal meniscus.   Skin:     General: Skin is warm and dry.      Findings: No erythema or rash.   Neurological:      Mental Status: He is alert and oriented to person, place, and time.      Cranial Nerves: No cranial nerve deficit.         Assessment:       1. Essential hypertension    2. Chronic pain of both knees "    3. Lumbar back pain         Plan:       Essential hypertension  Comments:  MUCH improved with the addition of the lisinopril. Will keep this on board. NO changes.  Orders:  -     lisinopriL 10 MG tablet; Take 1 tablet (10 mg total) by mouth once daily.  Dispense: 90 tablet; Refill: 1    Chronic pain of both knees  Comments:  check xrays now for further evaluation. May consider orthro for further eval.  Orders:  -     X-Ray Knee 1 or 2 View Bilateral; Future; Expected date: 09/06/2022    Lumbar back pain  Comments:  xrays now. could consider pain mgmt for further minimally invasive options.  Orders:  -     X-Ray Lumbar Spine 5 View; Future; Expected date: 09/06/2022    No follow-ups on file.        9/6/2022 Devaughn Banegas PA-C

## 2022-09-09 ENCOUNTER — HOSPITAL ENCOUNTER (OUTPATIENT)
Dept: RADIOLOGY | Facility: HOSPITAL | Age: 42
Discharge: HOME | End: 2022-09-09
Attending: PHYSICIAN ASSISTANT
Payer: COMMERCIAL

## 2022-09-09 DIAGNOSIS — M54.50 LUMBAR BACK PAIN: ICD-10-CM

## 2022-09-09 DIAGNOSIS — M25.561 CHRONIC PAIN OF BOTH KNEES: ICD-10-CM

## 2022-09-09 DIAGNOSIS — M25.562 CHRONIC PAIN OF BOTH KNEES: ICD-10-CM

## 2022-09-09 DIAGNOSIS — G89.29 CHRONIC PAIN OF BOTH KNEES: ICD-10-CM

## 2022-09-09 PROCEDURE — 73560 X-RAY EXAM OF KNEE 1 OR 2: CPT | Mod: TC,50,PO

## 2022-09-09 PROCEDURE — 72110 X-RAY EXAM L-2 SPINE 4/>VWS: CPT | Mod: TC,PO

## 2022-09-12 ENCOUNTER — PATIENT MESSAGE (OUTPATIENT)
Dept: FAMILY MEDICINE | Facility: CLINIC | Age: 42
End: 2022-09-12

## 2022-12-09 ENCOUNTER — OFFICE VISIT (OUTPATIENT)
Dept: FAMILY MEDICINE | Facility: CLINIC | Age: 42
End: 2022-12-09
Payer: COMMERCIAL

## 2022-12-09 VITALS
HEIGHT: 70 IN | WEIGHT: 188 LBS | BODY MASS INDEX: 26.92 KG/M2 | DIASTOLIC BLOOD PRESSURE: 88 MMHG | HEART RATE: 68 BPM | SYSTOLIC BLOOD PRESSURE: 136 MMHG

## 2022-12-09 DIAGNOSIS — F33.1 MAJOR DEPRESSIVE DISORDER, RECURRENT, MODERATE: Primary | ICD-10-CM

## 2022-12-09 DIAGNOSIS — I10 ESSENTIAL HYPERTENSION: ICD-10-CM

## 2022-12-09 PROCEDURE — 3008F PR BODY MASS INDEX (BMI) DOCUMENTED: ICD-10-PCS | Mod: CPTII,S$GLB,, | Performed by: PHYSICIAN ASSISTANT

## 2022-12-09 PROCEDURE — 3075F SYST BP GE 130 - 139MM HG: CPT | Mod: CPTII,S$GLB,, | Performed by: PHYSICIAN ASSISTANT

## 2022-12-09 PROCEDURE — 4010F PR ACE/ARB THEARPY RXD/TAKEN: ICD-10-PCS | Mod: CPTII,S$GLB,, | Performed by: PHYSICIAN ASSISTANT

## 2022-12-09 PROCEDURE — 99213 PR OFFICE/OUTPT VISIT, EST, LEVL III, 20-29 MIN: ICD-10-PCS | Mod: S$GLB,,, | Performed by: PHYSICIAN ASSISTANT

## 2022-12-09 PROCEDURE — 99213 OFFICE O/P EST LOW 20 MIN: CPT | Mod: S$GLB,,, | Performed by: PHYSICIAN ASSISTANT

## 2022-12-09 PROCEDURE — 1159F PR MEDICATION LIST DOCUMENTED IN MEDICAL RECORD: ICD-10-PCS | Mod: CPTII,S$GLB,, | Performed by: PHYSICIAN ASSISTANT

## 2022-12-09 PROCEDURE — 3075F PR MOST RECENT SYSTOLIC BLOOD PRESS GE 130-139MM HG: ICD-10-PCS | Mod: CPTII,S$GLB,, | Performed by: PHYSICIAN ASSISTANT

## 2022-12-09 PROCEDURE — 1159F MED LIST DOCD IN RCRD: CPT | Mod: CPTII,S$GLB,, | Performed by: PHYSICIAN ASSISTANT

## 2022-12-09 PROCEDURE — 3079F DIAST BP 80-89 MM HG: CPT | Mod: CPTII,S$GLB,, | Performed by: PHYSICIAN ASSISTANT

## 2022-12-09 PROCEDURE — 3079F PR MOST RECENT DIASTOLIC BLOOD PRESSURE 80-89 MM HG: ICD-10-PCS | Mod: CPTII,S$GLB,, | Performed by: PHYSICIAN ASSISTANT

## 2022-12-09 PROCEDURE — 3008F BODY MASS INDEX DOCD: CPT | Mod: CPTII,S$GLB,, | Performed by: PHYSICIAN ASSISTANT

## 2022-12-09 PROCEDURE — 4010F ACE/ARB THERAPY RXD/TAKEN: CPT | Mod: CPTII,S$GLB,, | Performed by: PHYSICIAN ASSISTANT

## 2022-12-09 RX ORDER — VORTIOXETINE 20 MG/1
20 TABLET, FILM COATED ORAL DAILY
Qty: 30 TABLET | Refills: 5 | Status: SHIPPED | OUTPATIENT
Start: 2022-12-09 | End: 2023-06-19

## 2022-12-09 NOTE — PROGRESS NOTES
SUBJECTIVE:    Patient ID: Al Romero is a 42 y.o. male.    Chief Complaint: Hypertension (Went over meds verbally, wants to talk about TRINTELLIX, Flu declined// SW)    This is a 42-year-old male who presents today for follow-up regarding blood pressure.  Was very well controlled last visit after the addition of lisinopril but today appears elevated. He is experiencing some tiredness and fatigue of late. Interested in coming off the alprazolam altogether. Thinks that this may be causing him to be more fatigued. Some sinuses and allergies now with the weather. He does have monitor at home and is open to watching closely.      No visits with results within 6 Month(s) from this visit.   Latest known visit with results is:   Office Visit on 01/24/2022   Component Date Value Ref Range Status    Amphetamines 01/29/2022 NEGATIVE  <500 ng/mL Final    Barbiturates 01/29/2022 NEGATIVE  <300 ng/mL Final    Benzodiazepines 01/29/2022 POSITIVE (A)  <100 ng/mL Corrected    Benzodiazepines 01/29/2022 => REVISED: Change in test result(s)   Corrected    Alphahydroxyalprazolam 01/29/2022 244 (H)  <25 ng/mL Corrected    Alprazolam 01/29/2022 => REVISED: Change in test result(s)   Corrected    Alphahydroxymidazolam 01/29/2022 NEGATIVE  <50 ng/mL Corrected    MEDMATCH AOH MIDAZOLAM 01/29/2022 => REVISED: Change in test result(s)   Corrected    Alphahydroxytriazolam 01/29/2022 NEGATIVE  <50 ng/mL Corrected    Triazolam 01/29/2022 => REVISED: Change in test result(s)   Corrected    Aminoclonazepam 01/29/2022 NEGATIVE  <25 ng/mL Corrected    MEDMATCH AMINOCLONAZEPAM 01/29/2022 => REVISED: Change in test result(s)   Corrected    hydroxyethylflurazepam UR GC/MS 01/29/2022 NEGATIVE  <50 ng/mL Corrected    MEDMATCH [place], ET FLURAZEPAM 01/29/2022 => REVISED: Change in test result(s)   Corrected    Lorazepam 01/29/2022 NEGATIVE  <50 ng/mL Corrected    Lorazepam 01/29/2022 => REVISED: Change in test result(s)   Corrected    Nordiazepam Lvl  01/29/2022 NEGATIVE  <50 ng/mL Corrected    Nordiazepam Lvl 01/29/2022 => REVISED: Change in test result(s)   Corrected    Oxazepam 01/29/2022 NEGATIVE  <50 ng/mL Corrected    Oxazepam 01/29/2022 => REVISED: Change in test result(s)   Corrected    Temazepam GC/MS Conf 01/29/2022 NEGATIVE  <50 ng/mL Corrected    Temazepam GC/MS Conf 01/29/2022 => REVISED: Change in test result(s)   Corrected    Benzodiazepines Comments 01/29/2022 See Benzodiazepines Notes, LDT Notes => REVISED: Change in test result(s)   Corrected    Cocaine Metabolites 01/29/2022 NEGATIVE  <150 ng/mL Final    Methadone 01/29/2022 NEGATIVE  <100 ng/mL Final    Opiates 01/29/2022 NEGATIVE  <100 ng/mL Final    Oxycodone 01/29/2022 NEGATIVE  <100 ng/mL Final    Phencyclidine 01/29/2022 NEGATIVE  <25 ng/mL Final    Creatinine 01/29/2022 203.9  > or = 20.0 mg/dL Final    pH 01/29/2022 5.7  4.5 - 9.0 Final    Oxidants, Urine (Tox) 01/29/2022 NEGATIVE  <200 mcg/mL Final    Notes and Comments 01/29/2022    Final       Past Medical History:   Diagnosis Date    Anxiety      History reviewed. No pertinent surgical history.  History reviewed. No pertinent family history.    Marital Status:   Alcohol History:  reports current alcohol use.  Tobacco History:  reports that he has been smoking cigarettes and vaping with nicotine. He has never used smokeless tobacco.  Drug History:  reports no history of drug use.    Review of patient's allergies indicates:  No Known Allergies    Current Outpatient Medications:     ALPRAZolam (XANAX) 0.5 MG tablet, Take 1 tablet (0.5 mg total) by mouth 2 (two) times daily as needed for Anxiety., Disp: 60 tablet, Rfl: 3    lisinopriL 10 MG tablet, Take 1 tablet (10 mg total) by mouth once daily., Disp: 90 tablet, Rfl: 1    vortioxetine (TRINTELLIX) 20 mg Tab, Take 1 tablet (20 mg total) by mouth once daily., Disp: 30 tablet, Rfl: 5    Review of Systems   Constitutional:  Negative for activity change, fatigue, fever and  "unexpected weight change.   HENT:  Negative for congestion.    Respiratory:  Negative for apnea, cough, chest tightness and shortness of breath.    Cardiovascular:  Negative for chest pain and palpitations.   Gastrointestinal:  Negative for abdominal distention and abdominal pain.   Genitourinary:  Negative for difficulty urinating and dysuria.   Musculoskeletal:  Positive for arthralgias and myalgias. Negative for back pain.   Neurological:  Negative for dizziness and weakness.        Objective:      Vitals:    12/09/22 0714 12/09/22 0719 12/09/22 0742   BP: (!) 132/100 (!) 130/98 136/88   Pulse: 68     Weight: 85.3 kg (188 lb)     Height: 5' 10" (1.778 m)       Physical Exam  Constitutional:       General: He is not in acute distress.     Appearance: He is well-developed.   HENT:      Head: Normocephalic and atraumatic.   Eyes:      Pupils: Pupils are equal, round, and reactive to light.   Neck:      Thyroid: No thyromegaly.   Cardiovascular:      Rate and Rhythm: Normal rate and regular rhythm.      Heart sounds: Normal heart sounds.   Pulmonary:      Effort: Pulmonary effort is normal.      Breath sounds: Normal breath sounds.   Abdominal:      General: Bowel sounds are normal. There is no distension.      Palpations: Abdomen is soft.      Tenderness: There is no abdominal tenderness.   Musculoskeletal:         General: Normal range of motion.      Cervical back: Normal range of motion and neck supple.   Skin:     General: Skin is warm and dry.      Findings: No erythema or rash.   Neurological:      Mental Status: He is alert and oriented to person, place, and time.      Cranial Nerves: No cranial nerve deficit.         Assessment:       1. Major depressive disorder, recurrent, moderate    2. Essential hypertension         Plan:       Major depressive disorder, recurrent, moderate  Comments:  Going to increase trintellix to 20mg. Stop alprazolam after slight taper.  Orders:  -     vortioxetine (TRINTELLIX) 20 " mg Tab; Take 1 tablet (20 mg total) by mouth once daily.  Dispense: 30 tablet; Refill: 5    Essential hypertension  Comments:  MUCH improved with the addition of the lisinopril. Will keep this on board. NO changes.    Follow up in about 3 months (around 3/9/2023) for Annual Physical.        12/9/2022 Devaughn Banegas PA-C

## 2023-03-29 NOTE — TELEPHONE ENCOUNTER
----- Message from Susanne Guadarrama sent at 11/25/2019  2:03 PM CST -----  Contact: Becky patient's wife  Patient's wife is calling . She would like to know can her  be scheduled to see dr. May soon ? He needs a visit so he can get a refill on his Xanax , but the appointment has to be around 2/ 2: 30 or later. Please give wife a call back   Becky's# [phone] or the patient [phone]   0 (no pain/absence of nonverbal indicators of pain)

## 2023-04-05 ENCOUNTER — TELEPHONE (OUTPATIENT)
Dept: FAMILY MEDICINE | Facility: CLINIC | Age: 43
End: 2023-04-05

## 2023-04-05 DIAGNOSIS — F41.9 ANXIETY: ICD-10-CM

## 2023-04-05 RX ORDER — ALPRAZOLAM 0.5 MG/1
0.5 TABLET ORAL 2 TIMES DAILY PRN
Qty: 60 TABLET | Refills: 3 | Status: CANCELLED | OUTPATIENT
Start: 2023-04-05 | End: 2023-10-02

## 2023-04-05 NOTE — TELEPHONE ENCOUNTER
----- Message from Joaquina Peacock sent at 4/5/2023 10:37 AM CDT -----  Pt is looking to wait 2 weeks as his wife had surgery and she is not able to do anything for 6 weeks. 3 pm or later is what works best any day   [phone]

## 2023-04-17 ENCOUNTER — PATIENT MESSAGE (OUTPATIENT)
Dept: ADMINISTRATIVE | Facility: HOSPITAL | Age: 43
End: 2023-04-17
Payer: COMMERCIAL

## 2023-06-19 ENCOUNTER — OFFICE VISIT (OUTPATIENT)
Dept: FAMILY MEDICINE | Facility: CLINIC | Age: 43
End: 2023-06-19
Payer: COMMERCIAL

## 2023-06-19 VITALS
DIASTOLIC BLOOD PRESSURE: 82 MMHG | HEIGHT: 70 IN | SYSTOLIC BLOOD PRESSURE: 136 MMHG | WEIGHT: 182 LBS | BODY MASS INDEX: 26.05 KG/M2 | HEART RATE: 72 BPM

## 2023-06-19 DIAGNOSIS — I10 ESSENTIAL HYPERTENSION: Primary | ICD-10-CM

## 2023-06-19 DIAGNOSIS — F41.9 ANXIETY: ICD-10-CM

## 2023-06-19 PROCEDURE — 1159F PR MEDICATION LIST DOCUMENTED IN MEDICAL RECORD: ICD-10-PCS | Mod: CPTII,S$GLB,, | Performed by: PHYSICIAN ASSISTANT

## 2023-06-19 PROCEDURE — 3008F BODY MASS INDEX DOCD: CPT | Mod: CPTII,S$GLB,, | Performed by: PHYSICIAN ASSISTANT

## 2023-06-19 PROCEDURE — 99396 PR PREVENTIVE VISIT,EST,40-64: ICD-10-PCS | Mod: S$GLB,,, | Performed by: PHYSICIAN ASSISTANT

## 2023-06-19 PROCEDURE — 3008F PR BODY MASS INDEX (BMI) DOCUMENTED: ICD-10-PCS | Mod: CPTII,S$GLB,, | Performed by: PHYSICIAN ASSISTANT

## 2023-06-19 PROCEDURE — 99396 PREV VISIT EST AGE 40-64: CPT | Mod: S$GLB,,, | Performed by: PHYSICIAN ASSISTANT

## 2023-06-19 PROCEDURE — 1159F MED LIST DOCD IN RCRD: CPT | Mod: CPTII,S$GLB,, | Performed by: PHYSICIAN ASSISTANT

## 2023-06-19 PROCEDURE — 3075F SYST BP GE 130 - 139MM HG: CPT | Mod: CPTII,S$GLB,, | Performed by: PHYSICIAN ASSISTANT

## 2023-06-19 PROCEDURE — 3075F PR MOST RECENT SYSTOLIC BLOOD PRESS GE 130-139MM HG: ICD-10-PCS | Mod: CPTII,S$GLB,, | Performed by: PHYSICIAN ASSISTANT

## 2023-06-19 PROCEDURE — 3079F DIAST BP 80-89 MM HG: CPT | Mod: CPTII,S$GLB,, | Performed by: PHYSICIAN ASSISTANT

## 2023-06-19 PROCEDURE — 3079F PR MOST RECENT DIASTOLIC BLOOD PRESSURE 80-89 MM HG: ICD-10-PCS | Mod: CPTII,S$GLB,, | Performed by: PHYSICIAN ASSISTANT

## 2023-06-19 PROCEDURE — 4010F ACE/ARB THERAPY RXD/TAKEN: CPT | Mod: CPTII,S$GLB,, | Performed by: PHYSICIAN ASSISTANT

## 2023-06-19 PROCEDURE — 4010F PR ACE/ARB THEARPY RXD/TAKEN: ICD-10-PCS | Mod: CPTII,S$GLB,, | Performed by: PHYSICIAN ASSISTANT

## 2023-06-19 RX ORDER — PAROXETINE 10 MG/1
10 TABLET, FILM COATED ORAL EVERY MORNING
Qty: 30 TABLET | Refills: 5 | Status: SHIPPED | OUTPATIENT
Start: 2023-06-19 | End: 2024-01-05

## 2023-06-19 RX ORDER — LISINOPRIL 10 MG/1
10 TABLET ORAL DAILY
Qty: 90 TABLET | Refills: 1 | Status: SHIPPED | OUTPATIENT
Start: 2023-06-19 | End: 2024-01-05 | Stop reason: SDUPTHER

## 2023-06-19 RX ORDER — ALPRAZOLAM 0.5 MG/1
0.5 TABLET ORAL 2 TIMES DAILY PRN
Qty: 60 TABLET | Refills: 3 | Status: SHIPPED | OUTPATIENT
Start: 2023-06-19 | End: 2023-11-28 | Stop reason: SDUPTHER

## 2023-06-19 NOTE — PROGRESS NOTES
SUBJECTIVE:    Patient ID: Al Romero is a 42 y.o. male.    Chief Complaint: Hypertension (Went over meds verbally// SW)    This is a 42-year-old male who presents today for regular 6 month checkup.  Blood pressure appears to be at goal.  Patient reports Doing pretty well. Decent amount of anxiety. Has been using alprazolam prn. He is interested in trying paxil daily to cut back on use of alprazolam. 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      No visits with results within 6 Month(s) from this visit.   Latest known visit with results is:   Office Visit on 01/24/2022   Component Date Value Ref Range Status    Amphetamines 01/29/2022 NEGATIVE  <500 ng/mL Final    Barbiturates 01/29/2022 NEGATIVE  <300 ng/mL Final    Benzodiazepines 01/29/2022 POSITIVE (A)  <100 ng/mL Corrected    Benzodiazepines 01/29/2022 => REVISED: Change in test result(s)   Corrected    Alphahydroxyalprazolam 01/29/2022 244 (H)  <25 ng/mL Corrected    Alprazolam 01/29/2022 => REVISED: Change in test result(s)   Corrected    Alphahydroxymidazolam 01/29/2022 NEGATIVE  <50 ng/mL Corrected    MEDMATCH AOH MIDAZOLAM 01/29/2022 => REVISED: Change in test result(s)   Corrected    Alphahydroxytriazolam 01/29/2022 NEGATIVE  <50 ng/mL Corrected    Triazolam 01/29/2022 => REVISED: Change in test result(s)   Corrected    Aminoclonazepam 01/29/2022 NEGATIVE  <25 ng/mL Corrected    MEDMATCH AMINOCLONAZEPAM 01/29/2022 => REVISED: Change in test result(s)   Corrected    hydroxyethylflurazepam UR GC/MS 01/29/2022 NEGATIVE  <50 ng/mL Corrected    MEDMATCH [place], ET FLURAZEPAM 01/29/2022 => REVISED: Change in test result(s)   Corrected    Lorazepam 01/29/2022 NEGATIVE  <50 ng/mL Corrected    Lorazepam 01/29/2022 => REVISED: Change in test result(s)   Corrected    Nordiazepam Lvl 01/29/2022 NEGATIVE  <50 ng/mL Corrected    Nordiazepam Lvl 01/29/2022 => REVISED: Change in test result(s)   Corrected    Oxazepam 01/29/2022 NEGATIVE  <50 ng/mL  Corrected    Oxazepam 01/29/2022 => REVISED: Change in test result(s)   Corrected    Temazepam GC/MS Conf 01/29/2022 NEGATIVE  <50 ng/mL Corrected    Temazepam GC/MS Conf 01/29/2022 => REVISED: Change in test result(s)   Corrected    Benzodiazepines Comments 01/29/2022 See Benzodiazepines Notes, LDT Notes => REVISED: Change in test result(s)   Corrected    Cocaine Metabolites 01/29/2022 NEGATIVE  <150 ng/mL Final    Methadone 01/29/2022 NEGATIVE  <100 ng/mL Final    Opiates 01/29/2022 NEGATIVE  <100 ng/mL Final    Oxycodone 01/29/2022 NEGATIVE  <100 ng/mL Final    Phencyclidine 01/29/2022 NEGATIVE  <25 ng/mL Final    Creatinine 01/29/2022 203.9  > or = 20.0 mg/dL Final    pH 01/29/2022 5.7  4.5 - 9.0 Final    Oxidants, Urine (Tox) 01/29/2022 NEGATIVE  <200 mcg/mL Final    Notes and Comments 01/29/2022    Final       Past Medical History:   Diagnosis Date    Anxiety      History reviewed. No pertinent surgical history.  History reviewed. No pertinent family history.    Marital Status:   Alcohol History:  reports current alcohol use.  Tobacco History:  reports that he has been smoking cigarettes and vaping with nicotine. He has been exposed to tobacco smoke. He has never used smokeless tobacco.  Drug History:  reports no history of drug use.    Review of patient's allergies indicates:  No Known Allergies    Current Outpatient Medications:     ALPRAZolam (XANAX) 0.5 MG tablet, Take 1 tablet (0.5 mg total) by mouth 2 (two) times daily as needed for Anxiety., Disp: 60 tablet, Rfl: 3    lisinopriL 10 MG tablet, Take 1 tablet (10 mg total) by mouth once daily., Disp: 90 tablet, Rfl: 1    paroxetine (PAXIL) 10 MG tablet, Take 1 tablet (10 mg total) by mouth every morning., Disp: 30 tablet, Rfl: 5    Review of Systems   Constitutional:  Negative for activity change, fatigue, fever and unexpected weight change.   HENT:  Negative for congestion.    Respiratory:  Negative for apnea, cough, chest tightness and shortness  "of breath.    Cardiovascular:  Negative for chest pain and palpitations.   Gastrointestinal:  Negative for abdominal distention and abdominal pain.   Genitourinary:  Negative for difficulty urinating and dysuria.   Musculoskeletal:  Positive for arthralgias and myalgias. Negative for back pain.   Neurological:  Negative for dizziness and weakness.        Objective:      Vitals:    06/19/23 1128   BP: 136/82   Pulse: 72   Weight: 82.6 kg (182 lb)   Height: 5' 10" (1.778 m)     Physical Exam  Constitutional:       General: He is not in acute distress.     Appearance: He is well-developed.   HENT:      Head: Normocephalic and atraumatic.   Eyes:      Pupils: Pupils are equal, round, and reactive to light.   Neck:      Thyroid: No thyromegaly.   Cardiovascular:      Rate and Rhythm: Normal rate and regular rhythm.      Heart sounds: Normal heart sounds.   Pulmonary:      Effort: Pulmonary effort is normal.      Breath sounds: Normal breath sounds.   Abdominal:      General: Bowel sounds are normal. There is no distension.      Palpations: Abdomen is soft.      Tenderness: There is no abdominal tenderness.   Musculoskeletal:         General: Normal range of motion.      Cervical back: Normal range of motion and neck supple.   Skin:     General: Skin is warm and dry.      Findings: No erythema or rash.   Neurological:      Mental Status: He is alert and oriented to person, place, and time.      Cranial Nerves: No cranial nerve deficit.         Assessment:       1. Essential hypertension    2. Anxiety         Plan:       Essential hypertension  Comments:  MUCH improved with the addition of the lisinopril. Will keep this on board. NO changes.  Orders:  -     lisinopriL 10 MG tablet; Take 1 tablet (10 mg total) by mouth once daily.  Dispense: 90 tablet; Refill: 1    Anxiety  Comments:  Hoping that with paroxetine addition we will be able to cut back on some use of alprazolam. f/u in 3 mos.  Orders:  -     ALPRAZolam (XANAX) " 0.5 MG tablet; Take 1 tablet (0.5 mg total) by mouth 2 (two) times daily as needed for Anxiety.  Dispense: 60 tablet; Refill: 3  -     paroxetine (PAXIL) 10 MG tablet; Take 1 tablet (10 mg total) by mouth every morning.  Dispense: 30 tablet; Refill: 5      Follow up in about 3 months (around 9/19/2023).        6/19/2023 Devaughn Banegas PA-C

## 2023-11-28 DIAGNOSIS — F41.9 ANXIETY: ICD-10-CM

## 2023-11-29 RX ORDER — ALPRAZOLAM 0.5 MG/1
0.5 TABLET ORAL 2 TIMES DAILY PRN
Qty: 60 TABLET | Refills: 3 | Status: SHIPPED | OUTPATIENT
Start: 2023-11-29 | End: 2024-01-05 | Stop reason: SDUPTHER

## 2023-11-29 NOTE — TELEPHONE ENCOUNTER
Last OV 6/19/2023  Upcoming OV 1/5/2024  UDS 1/29/2022    Per  last dispensed 09/20/2023    Rx order set up.

## 2024-01-05 ENCOUNTER — OFFICE VISIT (OUTPATIENT)
Dept: FAMILY MEDICINE | Facility: CLINIC | Age: 44
End: 2024-01-05
Payer: COMMERCIAL

## 2024-01-05 VITALS
SYSTOLIC BLOOD PRESSURE: 138 MMHG | DIASTOLIC BLOOD PRESSURE: 88 MMHG | HEART RATE: 65 BPM | HEIGHT: 70 IN | WEIGHT: 185 LBS | OXYGEN SATURATION: 95 % | BODY MASS INDEX: 26.48 KG/M2

## 2024-01-05 DIAGNOSIS — I10 ESSENTIAL HYPERTENSION: ICD-10-CM

## 2024-01-05 DIAGNOSIS — Z79.899 ENCOUNTER FOR LONG-TERM (CURRENT) USE OF OTHER MEDICATIONS: ICD-10-CM

## 2024-01-05 DIAGNOSIS — Z51.81 ENCOUNTER FOR THERAPEUTIC DRUG MONITORING: ICD-10-CM

## 2024-01-05 DIAGNOSIS — F41.9 ANXIETY: Primary | ICD-10-CM

## 2024-01-05 DIAGNOSIS — F41.9 ANXIETY: ICD-10-CM

## 2024-01-05 PROCEDURE — 99396 PREV VISIT EST AGE 40-64: CPT | Mod: S$GLB,,, | Performed by: PHYSICIAN ASSISTANT

## 2024-01-05 PROCEDURE — 3075F SYST BP GE 130 - 139MM HG: CPT | Mod: CPTII,S$GLB,, | Performed by: PHYSICIAN ASSISTANT

## 2024-01-05 PROCEDURE — 1159F MED LIST DOCD IN RCRD: CPT | Mod: CPTII,S$GLB,, | Performed by: PHYSICIAN ASSISTANT

## 2024-01-05 PROCEDURE — 3008F BODY MASS INDEX DOCD: CPT | Mod: CPTII,S$GLB,, | Performed by: PHYSICIAN ASSISTANT

## 2024-01-05 PROCEDURE — 3079F DIAST BP 80-89 MM HG: CPT | Mod: CPTII,S$GLB,, | Performed by: PHYSICIAN ASSISTANT

## 2024-01-05 RX ORDER — ALPRAZOLAM 0.5 MG/1
0.5 TABLET ORAL 2 TIMES DAILY PRN
Qty: 60 TABLET | Refills: 3 | Status: SHIPPED | OUTPATIENT
Start: 2024-01-05 | End: 2024-07-03

## 2024-01-05 RX ORDER — LISINOPRIL 10 MG/1
10 TABLET ORAL DAILY
Qty: 90 TABLET | Refills: 1 | Status: SHIPPED | OUTPATIENT
Start: 2024-01-05 | End: 2024-07-03

## 2024-01-05 NOTE — PROGRESS NOTES
SUBJECTIVE:    Patient ID: Al Romero is a 43 y.o. male.    Chief Complaint: Follow-up (No bottles//Pt is here for a check up//Decline flu vaccine//MUNIR )    43-year-old male presents today for regular six-month follow-up.  Blood pressure remains well managed after adding lisinopril earlier this year. Did add paxil last visit and was not able to tolerate it. Ultimately has stopped taking now.     Follow-up  Associated symptoms include weakness. Pertinent negatives include no arthralgias, chest pain, headaches, joint swelling, neck pain or vomiting.       No visits with results within 6 Month(s) from this visit.   Latest known visit with results is:   Office Visit on 01/24/2022   Component Date Value Ref Range Status    Amphetamines 01/29/2022 NEGATIVE  <500 ng/mL Final    Barbiturates 01/29/2022 NEGATIVE  <300 ng/mL Final    Benzodiazepines 01/29/2022 POSITIVE (A)  <100 ng/mL Corrected    Benzodiazepines 01/29/2022 => REVISED: Change in test result(s)   Corrected    Alphahydroxyalprazolam 01/29/2022 244 (H)  <25 ng/mL Corrected    Alprazolam 01/29/2022 => REVISED: Change in test result(s)   Corrected    Alphahydroxymidazolam 01/29/2022 NEGATIVE  <50 ng/mL Corrected    MEDMATCH AOH MIDAZOLAM 01/29/2022 => REVISED: Change in test result(s)   Corrected    Alphahydroxytriazolam 01/29/2022 NEGATIVE  <50 ng/mL Corrected    Triazolam 01/29/2022 => REVISED: Change in test result(s)   Corrected    Aminoclonazepam 01/29/2022 NEGATIVE  <25 ng/mL Corrected    MEDMATCH AMINOCLONAZEPAM 01/29/2022 => REVISED: Change in test result(s)   Corrected    hydroxyethylflurazepam UR GC/MS 01/29/2022 NEGATIVE  <50 ng/mL Corrected    MEDMATCH [place], ET FLURAZEPAM 01/29/2022 => REVISED: Change in test result(s)   Corrected    Lorazepam 01/29/2022 NEGATIVE  <50 ng/mL Corrected    Lorazepam 01/29/2022 => REVISED: Change in test result(s)   Corrected    Nordiazepam Lvl 01/29/2022 NEGATIVE  <50 ng/mL Corrected    Nordiazepam Lvl 01/29/2022 =>  REVISED: Change in test result(s)   Corrected    Oxazepam 01/29/2022 NEGATIVE  <50 ng/mL Corrected    Oxazepam 01/29/2022 => REVISED: Change in test result(s)   Corrected    Temazepam GC/MS Conf 01/29/2022 NEGATIVE  <50 ng/mL Corrected    Temazepam GC/MS Conf 01/29/2022 => REVISED: Change in test result(s)   Corrected    Benzodiazepines Comments 01/29/2022 See Benzodiazepines Notes, LDT Notes => REVISED: Change in test result(s)   Corrected    Cocaine Metabolites 01/29/2022 NEGATIVE  <150 ng/mL Final    Methadone 01/29/2022 NEGATIVE  <100 ng/mL Final    Opiates 01/29/2022 NEGATIVE  <100 ng/mL Final    Oxycodone 01/29/2022 NEGATIVE  <100 ng/mL Final    Phencyclidine 01/29/2022 NEGATIVE  <25 ng/mL Final    Creatinine 01/29/2022 203.9  > or = 20.0 mg/dL Final    pH 01/29/2022 5.7  4.5 - 9.0 Final    Oxidants, Urine (Tox) 01/29/2022 NEGATIVE  <200 mcg/mL Final    Notes and Comments 01/29/2022    Final       Past Medical History:   Diagnosis Date    Anxiety      History reviewed. No pertinent surgical history.  History reviewed. No pertinent family history.    Marital Status:   Alcohol History:  reports current alcohol use of about 2.0 standard drinks of alcohol per week.  Tobacco History:  reports that he has been smoking cigarettes and vaping with nicotine. He started smoking about 22 years ago. He has a 33.8 pack-year smoking history. He has been exposed to tobacco smoke. He has never used smokeless tobacco.  Drug History:  reports no history of drug use.    Review of patient's allergies indicates:   Allergen Reactions    Msg [glutamic acid]        Current Outpatient Medications:     ALPRAZolam (XANAX) 0.5 MG tablet, Take 1 tablet (0.5 mg total) by mouth 2 (two) times daily as needed for Anxiety., Disp: 60 tablet, Rfl: 3    lisinopriL 10 MG tablet, Take 1 tablet (10 mg total) by mouth once daily., Disp: 90 tablet, Rfl: 1    Review of Systems   Constitutional:  Negative for activity change and unexpected weight  "change.   HENT:  Negative for hearing loss, rhinorrhea and trouble swallowing.    Eyes:  Negative for discharge and visual disturbance.   Respiratory:  Positive for chest tightness. Negative for wheezing.    Cardiovascular:  Negative for chest pain and palpitations.   Gastrointestinal:  Negative for blood in stool, constipation, diarrhea and vomiting.   Endocrine: Negative for polydipsia and polyuria.   Genitourinary:  Negative for difficulty urinating, hematuria and urgency.   Musculoskeletal:  Negative for arthralgias, joint swelling and neck pain.   Neurological:  Positive for weakness. Negative for headaches.   Psychiatric/Behavioral:  Positive for dysphoric mood. Negative for confusion.           Objective:      Vitals:    01/05/24 0958 01/05/24 1002   BP: (!) 144/82 138/88   Pulse: 65    SpO2: 95%    Weight: 83.9 kg (185 lb)    Height: 5' 10" (1.778 m)      Physical Exam      Assessment:       1. Anxiety    2. Encounter for long-term (current) use of other medications    3. Encounter for therapeutic drug monitoring    4. Essential hypertension    5. Anxiety         Plan:       Anxiety  Comments:  refills as needed UDS to be completed today.  Orders:  -     DRUG MONITOR, PANEL 4, W/CONF, URINE; Future; Expected date: 01/05/2024  -     ALPRAZolam (XANAX) 0.5 MG tablet; Take 1 tablet (0.5 mg total) by mouth 2 (two) times daily as needed for Anxiety.  Dispense: 60 tablet; Refill: 3    Encounter for long-term (current) use of other medications  -     DRUG MONITOR, PANEL 4, W/CONF, URINE; Future; Expected date: 01/05/2024  -     CBC W/ AUTO DIFFERENTIAL; Future; Expected date: 01/05/2024  -     Comprehensive Metabolic Panel; Future; Expected date: 01/05/2024  -     Lipid Panel; Future; Expected date: 01/05/2024  -     TSH; Future; Expected date: 01/05/2024  -     Urinalysis, Reflex to Urine Culture Urine, Clean Catch; Future; Expected date: 01/05/2024    Encounter for therapeutic drug monitoring  -     DRUG MONITOR, " PANEL 4, W/CONF, URINE; Future; Expected date: 01/05/2024    Essential hypertension  Comments:  Remains improved with the addition of the lisinopril. Will keep this on board. NO changes.  Orders:  -     lisinopriL 10 MG tablet; Take 1 tablet (10 mg total) by mouth once daily.  Dispense: 90 tablet; Refill: 1    Anxiety  Comments:  unable to tolerate paxil. will keep alprazolam on board prn use. UDS today  Orders:  -     DRUG MONITOR, PANEL 4, W/CONF, URINE; Future; Expected date: 01/05/2024  -     ALPRAZolam (XANAX) 0.5 MG tablet; Take 1 tablet (0.5 mg total) by mouth 2 (two) times daily as needed for Anxiety.  Dispense: 60 tablet; Refill: 3      Follow up in about 6 months (around 7/5/2024).        1/5/2024 Devaughn Banegas PA-C

## 2024-07-22 ENCOUNTER — OFFICE VISIT (OUTPATIENT)
Dept: FAMILY MEDICINE | Facility: CLINIC | Age: 44
End: 2024-07-22
Payer: COMMERCIAL

## 2024-07-22 VITALS
HEIGHT: 70 IN | HEART RATE: 74 BPM | RESPIRATION RATE: 18 BRPM | BODY MASS INDEX: 26.34 KG/M2 | DIASTOLIC BLOOD PRESSURE: 82 MMHG | SYSTOLIC BLOOD PRESSURE: 128 MMHG | WEIGHT: 184 LBS | OXYGEN SATURATION: 98 %

## 2024-07-22 DIAGNOSIS — Z79.899 ENCOUNTER FOR LONG-TERM (CURRENT) USE OF OTHER MEDICATIONS: ICD-10-CM

## 2024-07-22 DIAGNOSIS — K04.7 TOOTH ABSCESS: ICD-10-CM

## 2024-07-22 DIAGNOSIS — I10 ESSENTIAL HYPERTENSION: Primary | ICD-10-CM

## 2024-07-22 DIAGNOSIS — F41.9 ANXIETY: ICD-10-CM

## 2024-07-22 PROCEDURE — 1159F MED LIST DOCD IN RCRD: CPT | Mod: CPTII,S$GLB,, | Performed by: PHYSICIAN ASSISTANT

## 2024-07-22 PROCEDURE — 3008F BODY MASS INDEX DOCD: CPT | Mod: CPTII,S$GLB,, | Performed by: PHYSICIAN ASSISTANT

## 2024-07-22 PROCEDURE — 3079F DIAST BP 80-89 MM HG: CPT | Mod: CPTII,S$GLB,, | Performed by: PHYSICIAN ASSISTANT

## 2024-07-22 PROCEDURE — 3074F SYST BP LT 130 MM HG: CPT | Mod: CPTII,S$GLB,, | Performed by: PHYSICIAN ASSISTANT

## 2024-07-22 PROCEDURE — 99214 OFFICE O/P EST MOD 30 MIN: CPT | Mod: S$GLB,,, | Performed by: PHYSICIAN ASSISTANT

## 2024-07-22 PROCEDURE — 4010F ACE/ARB THERAPY RXD/TAKEN: CPT | Mod: CPTII,S$GLB,, | Performed by: PHYSICIAN ASSISTANT

## 2024-07-22 RX ORDER — LISINOPRIL 10 MG/1
10 TABLET ORAL DAILY
Qty: 90 TABLET | Refills: 1 | Status: SHIPPED | OUTPATIENT
Start: 2024-07-22 | End: 2025-01-18

## 2024-07-22 RX ORDER — ALPRAZOLAM 0.5 MG/1
0.5 TABLET ORAL 2 TIMES DAILY PRN
Qty: 60 TABLET | Refills: 3 | Status: SHIPPED | OUTPATIENT
Start: 2024-07-22 | End: 2025-01-18

## 2024-07-22 RX ORDER — AMOXICILLIN AND CLAVULANATE POTASSIUM 875; 125 MG/1; MG/1
1 TABLET, FILM COATED ORAL 2 TIMES DAILY
Qty: 20 TABLET | Refills: 0 | Status: SHIPPED | OUTPATIENT
Start: 2024-07-22 | End: 2024-08-01

## 2024-07-22 NOTE — PROGRESS NOTES
SUBJECTIVE:    Patient ID: Al Romero is a 43 y.o. male.    Chief Complaint: Follow-up (No bottles//refills needed//pt states he woke up with a abscess on right side of face.//last taken xanax was this am /uds pended)    This is a 42 yo male who presents today for regular checkup and refills. Reports that he has been doing pretty well overall. No major concerns at this time. Did wakeup with tooth abscess upper RT tooth and swelling. Knows that he needs some dental work but just VERY busy at this time and needs a few weeks to get that arranged.     Follow-up  Pertinent negatives include no arthralgias, chest pain, headaches, joint swelling, neck pain, vomiting or weakness.       No visits with results within 6 Month(s) from this visit.   Latest known visit with results is:   Office Visit on 01/24/2022   Component Date Value Ref Range Status    Amphetamines 01/29/2022 NEGATIVE  <500 ng/mL Final    Barbiturates 01/29/2022 NEGATIVE  <300 ng/mL Final    Benzodiazepines 01/29/2022 POSITIVE (A)  <100 ng/mL Corrected    Benzodiazepines 01/29/2022 => REVISED: Change in test result(s)   Corrected    Alphahydroxyalprazolam 01/29/2022 244 (H)  <25 ng/mL Corrected    Alprazolam 01/29/2022 => REVISED: Change in test result(s)   Corrected    Alphahydroxymidazolam 01/29/2022 NEGATIVE  <50 ng/mL Corrected    MEDMATCH AOH MIDAZOLAM 01/29/2022 => REVISED: Change in test result(s)   Corrected    Alphahydroxytriazolam 01/29/2022 NEGATIVE  <50 ng/mL Corrected    Triazolam 01/29/2022 => REVISED: Change in test result(s)   Corrected    Aminoclonazepam 01/29/2022 NEGATIVE  <25 ng/mL Corrected    MEDMATCH AMINOCLONAZEPAM 01/29/2022 => REVISED: Change in test result(s)   Corrected    hydroxyethylflurazepam UR GC/MS 01/29/2022 NEGATIVE  <50 ng/mL Corrected    MEDMATCH OH, ET FLURAZEPAM 01/29/2022 => REVISED: Change in test result(s)   Corrected    Lorazepam 01/29/2022 NEGATIVE  <50 ng/mL Corrected    Lorazepam 01/29/2022 => REVISED:  Change in test result(s)   Corrected    Nordiazepam Lvl 01/29/2022 NEGATIVE  <50 ng/mL Corrected    Nordiazepam Lvl 01/29/2022 => REVISED: Change in test result(s)   Corrected    Oxazepam 01/29/2022 NEGATIVE  <50 ng/mL Corrected    Oxazepam 01/29/2022 => REVISED: Change in test result(s)   Corrected    Temazepam GC/MS Conf 01/29/2022 NEGATIVE  <50 ng/mL Corrected    Temazepam GC/MS Conf 01/29/2022 => REVISED: Change in test result(s)   Corrected    Benzodiazepines Comments 01/29/2022 See Benzodiazepines Notes, LDT Notes => REVISED: Change in test result(s)   Corrected    Cocaine Metabolites 01/29/2022 NEGATIVE  <150 ng/mL Final    Methadone 01/29/2022 NEGATIVE  <100 ng/mL Final    Opiates 01/29/2022 NEGATIVE  <100 ng/mL Final    Oxycodone 01/29/2022 NEGATIVE  <100 ng/mL Final    Phencyclidine 01/29/2022 NEGATIVE  <25 ng/mL Final    Creatinine 01/29/2022 203.9  > or = 20.0 mg/dL Final    pH 01/29/2022 5.7  4.5 - 9.0 Final    Oxidants, Urine (Tox) 01/29/2022 NEGATIVE  <200 mcg/mL Final    Notes and Comments 01/29/2022    Final       Past Medical History:   Diagnosis Date    Anxiety      History reviewed. No pertinent surgical history.  No family history on file.    Marital Status:   Alcohol History:  reports current alcohol use of about 2.0 standard drinks of alcohol per week.  Tobacco History:  reports that he has been smoking cigarettes and vaping with nicotine. He started smoking about 23 years ago. He has a 34.6 pack-year smoking history. He has been exposed to tobacco smoke. He has never used smokeless tobacco.  Drug History:  reports no history of drug use.    Review of patient's allergies indicates:   Allergen Reactions    Msg [glutamic acid]        Current Outpatient Medications:     ALPRAZolam (XANAX) 0.5 MG tablet, Take 1 tablet (0.5 mg total) by mouth 2 (two) times daily as needed for Anxiety., Disp: 60 tablet, Rfl: 3    amoxicillin-clavulanate 875-125mg (AUGMENTIN) 875-125 mg per tablet, Take 1  "tablet by mouth 2 (two) times daily. for 10 days, Disp: 20 tablet, Rfl: 0    lisinopriL 10 MG tablet, Take 1 tablet (10 mg total) by mouth once daily., Disp: 90 tablet, Rfl: 1    Review of Systems   Constitutional:  Negative for activity change and unexpected weight change.   HENT:  Negative for hearing loss, rhinorrhea and trouble swallowing.    Eyes:  Negative for discharge and visual disturbance.   Respiratory:  Negative for chest tightness and wheezing.    Cardiovascular:  Negative for chest pain and palpitations.   Gastrointestinal:  Negative for blood in stool, constipation, diarrhea and vomiting.   Endocrine: Negative for polydipsia and polyuria.   Genitourinary:  Negative for difficulty urinating, hematuria and urgency.   Musculoskeletal:  Negative for arthralgias, joint swelling and neck pain.   Neurological:  Negative for weakness and headaches.   Psychiatric/Behavioral:  Negative for confusion and dysphoric mood.           Objective:      Vitals:    07/22/24 0919   BP: 128/82   Pulse: 74   Resp: 18   SpO2: 98%   Weight: 83.5 kg (184 lb)   Height: 5' 10" (1.778 m)     Physical Exam      Assessment:       1. Essential hypertension    2. Anxiety    3. Tooth abscess    4. Encounter for long-term (current) use of other medications         Plan:       Essential hypertension  Comments:  Remains improved with the addition of the lisinopril. Will keep this on board. NO changes.  Orders:  -     lisinopriL 10 MG tablet; Take 1 tablet (10 mg total) by mouth once daily.  Dispense: 90 tablet; Refill: 1    Anxiety  Comments:  unable to tolerate paxil. will keep alprazolam on board prn use. UDS today  Orders:  -     DRUG MONITOR, PANEL 4, W/CONF, URINE; Future; Expected date: 07/22/2024  -     ALPRAZolam (XANAX) 0.5 MG tablet; Take 1 tablet (0.5 mg total) by mouth 2 (two) times daily as needed for Anxiety.  Dispense: 60 tablet; Refill: 3    Tooth abscess  Comments:  Going to treat now and he is aware that he needs a " dentist for further evaluation and definitive management  Orders:  -     amoxicillin-clavulanate 875-125mg (AUGMENTIN) 875-125 mg per tablet; Take 1 tablet by mouth 2 (two) times daily. for 10 days  Dispense: 20 tablet; Refill: 0    Encounter for long-term (current) use of other medications  Comments:  Labs to be done asap for ongoing pt care      Follow up in about 6 months (around 1/22/2025).        7/22/2024 Devaughn Banegas PA-C

## 2024-10-15 ENCOUNTER — PATIENT MESSAGE (OUTPATIENT)
Dept: FAMILY MEDICINE | Facility: CLINIC | Age: 44
End: 2024-10-15
Payer: COMMERCIAL

## 2024-11-22 DIAGNOSIS — F41.9 ANXIETY: ICD-10-CM

## 2024-11-22 RX ORDER — ALPRAZOLAM 0.5 MG/1
0.5 TABLET ORAL 2 TIMES DAILY PRN
Qty: 60 TABLET | Refills: 3 | Status: SHIPPED | OUTPATIENT
Start: 2024-11-22 | End: 2025-05-21

## 2025-03-06 ENCOUNTER — OFFICE VISIT (OUTPATIENT)
Dept: FAMILY MEDICINE | Facility: CLINIC | Age: 45
End: 2025-03-06
Payer: COMMERCIAL

## 2025-03-06 VITALS
WEIGHT: 183 LBS | BODY MASS INDEX: 26.2 KG/M2 | HEART RATE: 72 BPM | HEIGHT: 70 IN | SYSTOLIC BLOOD PRESSURE: 138 MMHG | DIASTOLIC BLOOD PRESSURE: 88 MMHG

## 2025-03-06 DIAGNOSIS — Z51.81 ENCOUNTER FOR THERAPEUTIC DRUG MONITORING: ICD-10-CM

## 2025-03-06 DIAGNOSIS — F41.9 ANXIETY: Primary | ICD-10-CM

## 2025-03-06 DIAGNOSIS — I10 ESSENTIAL HYPERTENSION: ICD-10-CM

## 2025-03-06 DIAGNOSIS — F17.200 SMOKING: ICD-10-CM

## 2025-03-06 PROCEDURE — 99214 OFFICE O/P EST MOD 30 MIN: CPT | Mod: S$GLB,,, | Performed by: PHYSICIAN ASSISTANT

## 2025-03-06 PROCEDURE — 3079F DIAST BP 80-89 MM HG: CPT | Mod: CPTII,S$GLB,, | Performed by: PHYSICIAN ASSISTANT

## 2025-03-06 PROCEDURE — 1159F MED LIST DOCD IN RCRD: CPT | Mod: CPTII,S$GLB,, | Performed by: PHYSICIAN ASSISTANT

## 2025-03-06 PROCEDURE — 3075F SYST BP GE 130 - 139MM HG: CPT | Mod: CPTII,S$GLB,, | Performed by: PHYSICIAN ASSISTANT

## 2025-03-06 PROCEDURE — 3008F BODY MASS INDEX DOCD: CPT | Mod: CPTII,S$GLB,, | Performed by: PHYSICIAN ASSISTANT

## 2025-03-06 RX ORDER — VENLAFAXINE HYDROCHLORIDE 37.5 MG/1
37.5 CAPSULE, EXTENDED RELEASE ORAL DAILY
Qty: 30 CAPSULE | Refills: 11 | Status: SHIPPED | OUTPATIENT
Start: 2025-03-06 | End: 2026-03-06

## 2025-03-06 RX ORDER — IBUPROFEN 200 MG
1 TABLET ORAL DAILY
Qty: 30 PATCH | Refills: 1 | Status: SHIPPED | OUTPATIENT
Start: 2025-03-06 | End: 2025-05-05

## 2025-03-06 NOTE — PROGRESS NOTES
SUBJECTIVE:    Patient ID: Al Romero is a 44 y.o. male.    Chief Complaint: Follow-up (No bottles//routine 6 month follow up//discuss smoking cessation-has med his friend tried, 3 step nicotine patches//BP's were good, stopped lisinopril. )    Mr. Romero is a 45 y/o M presenting for follow up. Since last visit, he had controlled his BP with addition of lisinopril. He had been monitoring at home and his BP had been running normal so he stopped taking the lisinopril. Today, his BP is elevated to 156/92. He admits to taking a 5 hour energy prior to his appointment and being stresses with work. He continues to take 2 xanax per day, sometimes 3, for anxiety and stress. Today, he is interested in quitting smoking. He reports his friend recently started the three step nicotine patch and he is interested in trying that. He currently smokes 1.5-2 packs per day. He also endorses waking up feeling not rested. Hx of CHIQUITA and is non-compliant with CPAP.     Follow-up  Associated symptoms include arthralgias and fatigue. Pertinent negatives include no chest pain, headaches, joint swelling, neck pain, vomiting or weakness.       No visits with results within 6 Month(s) from this visit.   Latest known visit with results is:   Office Visit on 01/05/2024   Component Date Value Ref Range Status    Amphetamines 07/25/2024 NEGATIVE  <500 ng/mL Final    Barbiturates 07/25/2024 NEGATIVE  <300 ng/mL Final    Benzodiazepines 07/25/2024 POSITIVE (A)  <100 ng/mL Final    Alphahydroxyalprazolam 07/25/2024 209 (H)  <25 ng/mL Final    Alphahydroxymidazolam 07/25/2024 NEGATIVE  <50 ng/mL Final    Alphahydroxytriazolam 07/25/2024 NEGATIVE  <50 ng/mL Final    Aminoclonazepam 07/25/2024 NEGATIVE  <25 ng/mL Final    hydroxyethylflurazepam UR GC/MS 07/25/2024 NEGATIVE  <50 ng/mL Final    Lorazepam 07/25/2024 NEGATIVE  <50 ng/mL Final    Nordiazepam Lvl 07/25/2024 NEGATIVE  <50 ng/mL Final    Oxazepam 07/25/2024 NEGATIVE  <50 ng/mL Final     Temazepam GC/MS Conf 07/25/2024 NEGATIVE  <50 ng/mL Final    Benzodiazepines Comments 07/25/2024 SEE COMMENT   Final    Cocaine Metabolites 07/25/2024 NEGATIVE  <150 ng/mL Final    Methadone 07/25/2024 NEGATIVE  <100 ng/mL Final    Opiates 07/25/2024 NEGATIVE  <100 ng/mL Final    Oxycodone 07/25/2024 NEGATIVE  <100 ng/mL Final    Phencyclidine 07/25/2024 NEGATIVE  <25 ng/mL Final    Creatinine 07/25/2024 169.5  > or = 20.0 mg/dL Final    pH 07/25/2024 5.6  4.5 - 9.0 Final    Oxidants, Urine (Tox) 07/25/2024 NEGATIVE  <200 mcg/mL Final    Notes and Comments 07/25/2024 SEE COMMENT   Final    WBC 07/25/2024 6.7  3.8 - 10.8 Thousand/uL Final    RBC 07/25/2024 4.79  4.20 - 5.80 Million/uL Final    Hemoglobin 07/25/2024 15.6  13.2 - 17.1 g/dL Final    Hematocrit 07/25/2024 47.5  38.5 - 50.0 % Final    MCV 07/25/2024 99.2  80.0 - 100.0 fL Final    MCH 07/25/2024 32.6  27.0 - 33.0 pg Final    MCHC 07/25/2024 32.8  32.0 - 36.0 g/dL Final    RDW 07/25/2024 12.7  11.0 - 15.0 % Final    Platelets 07/25/2024 257  140 - 400 Thousand/uL Final    MPV 07/25/2024 10.7  7.5 - 12.5 fL Final    Neutrophils, Abs 07/25/2024 3,203  1,500 - 7,800 cells/uL Final    Lymph # 07/25/2024 1,836  850 - 3,900 cells/uL Final    Mono # 07/25/2024 1,119 (H)  200 - 950 cells/uL Final    Eos # 07/25/2024 422  15 - 500 cells/uL Final    Baso # 07/25/2024 121  0 - 200 cells/uL Final    Neutrophils Relative 07/25/2024 47.8  % Final    Lymph % 07/25/2024 27.4  % Final    Mono % 07/25/2024 16.7  % Final    Eosinophil % 07/25/2024 6.3  % Final    Basophil % 07/25/2024 1.8  % Final    Glucose 07/25/2024 100 (H)  65 - 99 mg/dL Final    BUN 07/25/2024 15  7 - 25 mg/dL Final    Creatinine 07/25/2024 0.86  0.60 - 1.29 mg/dL Final    eGFR 07/25/2024 112  > OR = 60 mL/min/1.73m2 Final    BUN/Creatinine Ratio 07/25/2024 SEE NOTE:  6 - 22 (calc) Final    Sodium 07/25/2024 141  135 - 146 mmol/L Final    Potassium 07/25/2024 4.5  3.5 - 5.3 mmol/L Final    Chloride  07/25/2024 106  98 - 110 mmol/L Final    CO2 07/25/2024 27  20 - 32 mmol/L Final    Calcium 07/25/2024 9.7  8.6 - 10.3 mg/dL Final    Total Protein 07/25/2024 7.0  6.1 - 8.1 g/dL Final    Albumin 07/25/2024 4.6  3.6 - 5.1 g/dL Final    Globulin, Total 07/25/2024 2.4  1.9 - 3.7 g/dL (calc) Final    Albumin/Globulin Ratio 07/25/2024 1.9  1.0 - 2.5 (calc) Final    Total Bilirubin 07/25/2024 0.4  0.2 - 1.2 mg/dL Final    Alkaline Phosphatase 07/25/2024 101  36 - 130 U/L Final    AST 07/25/2024 12  10 - 40 U/L Final    ALT 07/25/2024 16  9 - 46 U/L Final    Cholesterol 07/25/2024 166  <200 mg/dL Final    HDL 07/25/2024 67  > OR = 40 mg/dL Final    Triglycerides 07/25/2024 54  <150 mg/dL Final    LDL Cholesterol 07/25/2024 86  mg/dL (calc) Final    HDL/Cholesterol Ratio 07/25/2024 2.5  <5.0 (calc) Final    Non HDL Chol. (LDL+VLDL) 07/25/2024 99  <130 mg/dL (calc) Final    TSH 07/25/2024 2.32  0.40 - 4.50 mIU/L Final    Color, UA 07/25/2024 YELLOW  YELLOW Final    Appearance, UA 07/25/2024 CLEAR  CLEAR Final    Specific Gravity, UA 07/25/2024 1.025  1.001 - 1.035 Final    pH, UA 07/25/2024 5.5  5.0 - 8.0 Final    Glucose, UA 07/25/2024 NEGATIVE  NEGATIVE Final    Bilirubin, UA 07/25/2024 NEGATIVE  NEGATIVE Final    Ketones, UA 07/25/2024 NEGATIVE  NEGATIVE Final    Occult Blood UA 07/25/2024 NEGATIVE  NEGATIVE Final    Protein, UA 07/25/2024 NEGATIVE  NEGATIVE Final    Nitrite, UA 07/25/2024 NEGATIVE  NEGATIVE Final    Leukocytes, UA 07/25/2024 NEGATIVE  NEGATIVE Final    WBC Casts, UA 07/25/2024 NONE SEEN  < OR = 5 /HPF Final    RBC Casts, UA 07/25/2024 NONE SEEN  < OR = 2 /HPF Final    Squam Epithel, UA 07/25/2024 NONE SEEN  < OR = 5 /HPF Final    Bacteria, UA 07/25/2024 NONE SEEN  NONE SEEN /HPF Final    Hyaline Casts, UA 07/25/2024 NONE SEEN  NONE SEEN /LPF Final    Service Cmt: 07/25/2024 SEE COMMENT   Final    Reflexive Urine Culture 07/25/2024 SEE COMMENT   Final       Past Medical History:   Diagnosis Date     Anxiety      No past surgical history on file.  No family history on file.    All of your core healthy metrics are met.      The 10-year CVD risk score (D'Agostino, et al., 2008) is: 6.6%    Values used to calculate the score:      Age: 44 years      Sex: Male      Diabetic: No      Tobacco smoker: Yes      Systolic Blood Pressure: 138 mmHg      Is BP treated: No      HDL Cholesterol: 67 mg/dL      Total Cholesterol: 166 mg/dL     Marital Status:   Alcohol History:  reports current alcohol use of about 2.0 standard drinks of alcohol per week.  Tobacco History:  reports that he has been smoking cigarettes and vaping with nicotine. He started smoking about 23 years ago. He has a 35.6 pack-year smoking history. He has been exposed to tobacco smoke. He has never used smokeless tobacco.  Drug History:  reports no history of drug use.    Health Maintenance Topics with due status: Not Due       Topic Last Completion Date    Lipid Panel 07/25/2024    RSV Vaccine (Age 60+ and Pregnant patients) Not Due       There is no immunization history on file for this patient.    Review of patient's allergies indicates:   Allergen Reactions    Msg [glutamic acid]      Current Medications[1]    Review of Systems   Constitutional:  Positive for fatigue. Negative for activity change and unexpected weight change.   HENT:  Negative for hearing loss, rhinorrhea and trouble swallowing.    Eyes:  Negative for discharge and visual disturbance.   Respiratory:  Negative for chest tightness and wheezing.    Cardiovascular:  Negative for chest pain and palpitations.   Gastrointestinal:  Negative for blood in stool, constipation, diarrhea and vomiting.   Endocrine: Positive for polydipsia. Negative for polyuria.   Genitourinary:  Negative for difficulty urinating, hematuria and urgency.   Musculoskeletal:  Positive for arthralgias. Negative for joint swelling and neck pain.   Neurological:  Negative for weakness and headaches.  "  Psychiatric/Behavioral:  Negative for confusion and dysphoric mood.           Objective:      Vitals:    03/06/25 1234 03/06/25 1315   BP: (!) 152/96 138/88   Pulse:  72   Weight: 83 kg (183 lb)    Height: 5' 10" (1.778 m)      Physical Exam  Constitutional:       Appearance: Normal appearance.   HENT:      Head: Normocephalic and atraumatic.      Nose: Nose normal.      Mouth/Throat:      Mouth: Mucous membranes are moist.   Eyes:      Extraocular Movements: Extraocular movements intact.      Pupils: Pupils are equal, round, and reactive to light.   Cardiovascular:      Rate and Rhythm: Normal rate and regular rhythm.      Pulses: Normal pulses.      Heart sounds: Normal heart sounds.   Pulmonary:      Effort: Pulmonary effort is normal.      Breath sounds: Normal breath sounds.   Abdominal:      General: Abdomen is flat.      Palpations: Abdomen is soft.   Musculoskeletal:         General: Normal range of motion.      Cervical back: Normal range of motion.   Skin:     General: Skin is warm.      Capillary Refill: Capillary refill takes less than 2 seconds.   Neurological:      General: No focal deficit present.      Mental Status: He is alert and oriented to person, place, and time.   Psychiatric:         Mood and Affect: Mood normal.         Behavior: Behavior normal.           Assessment:       1. Anxiety    2. Essential hypertension    3. Encounter for therapeutic drug monitoring    4. Smoking           Plan:       1. Anxiety  Comments:  start effexor- will keep alprazolam strictly prn now. f/u 3 mos.  Orders:  -     venlafaxine (EFFEXOR-XR) 37.5 MG 24 hr capsule; Take 1 capsule (37.5 mg total) by mouth once daily.  Dispense: 30 capsule; Refill: 11    2. Essential hypertension  Comments:  Recheck looks ok. Will have patient monitor and come back in 1-2 weeks for BP check. call/message    3. Encounter for therapeutic drug monitoring    4. Smoking  Comments:  Will trial with 3 step nicotine patch " treatment  Orders:  -     nicotine (NICODERM CQ) 21 mg/24 hr; Place 1 patch onto the skin once daily.  Dispense: 30 patch; Refill: 1      Follow up in about 3 months (around 6/6/2025).          Counseled on age and gender appropriate medical preventative services, including cancer screenings, immunizations, overall nutritional health, need for a consistent exercise regimen and an overall push towards maintaining a vigorous and active lifestyle.      3/6/2025 Devaughn Banegas           [1]   Current Outpatient Medications:     ALPRAZolam (XANAX) 0.5 MG tablet, Take 1 tablet (0.5 mg total) by mouth 2 (two) times daily as needed for Anxiety., Disp: 60 tablet, Rfl: 3    lisinopriL 10 MG tablet, Take 1 tablet (10 mg total) by mouth once daily. (Patient not taking: Reported on 3/6/2025), Disp: 90 tablet, Rfl: 1    nicotine (NICODERM CQ) 21 mg/24 hr, Place 1 patch onto the skin once daily., Disp: 30 patch, Rfl: 1    venlafaxine (EFFEXOR-XR) 37.5 MG 24 hr capsule, Take 1 capsule (37.5 mg total) by mouth once daily., Disp: 30 capsule, Rfl: 11

## 2025-04-01 DIAGNOSIS — F41.9 ANXIETY: ICD-10-CM

## 2025-04-02 RX ORDER — ALPRAZOLAM 0.5 MG/1
0.5 TABLET ORAL 2 TIMES DAILY PRN
Qty: 60 TABLET | Refills: 3 | Status: SHIPPED | OUTPATIENT
Start: 2025-04-02 | End: 2025-09-29

## 2025-05-02 ENCOUNTER — TELEPHONE (OUTPATIENT)
Dept: PHARMACY | Facility: CLINIC | Age: 45
End: 2025-05-02
Payer: COMMERCIAL

## 2025-05-03 NOTE — TELEPHONE ENCOUNTER
[facility]/Population Health Chart Review & Patient Outreach Details For Medication Adherence Project    Reason for Outreach Encounter: 3rd Party payor non-compliance report ([employer], [employer], [employer], etc)  2.  Patient Outreach Method: Reviewed Patient Chart  3.   Medication in question: lisinopril   LAST FILLED: n/a  Hypertension Medications              lisinopriL 10 MG tablet Take 1 tablet (10 mg total) by mouth once daily.              4.  Reviewed and or Updates Made To: Patient Chart  5. Outreach Outcomes and/or actions taken: Medication discontinued    Additional Notes:

## 2025-06-10 ENCOUNTER — OFFICE VISIT (OUTPATIENT)
Dept: FAMILY MEDICINE | Facility: CLINIC | Age: 45
End: 2025-06-10
Payer: COMMERCIAL

## 2025-06-10 VITALS
HEART RATE: 60 BPM | HEIGHT: 70 IN | SYSTOLIC BLOOD PRESSURE: 130 MMHG | BODY MASS INDEX: 26.31 KG/M2 | DIASTOLIC BLOOD PRESSURE: 68 MMHG | OXYGEN SATURATION: 98 % | WEIGHT: 183.81 LBS

## 2025-06-10 DIAGNOSIS — Z51.81 ENCOUNTER FOR THERAPEUTIC DRUG MONITORING: ICD-10-CM

## 2025-06-10 DIAGNOSIS — F41.9 ANXIETY: Primary | ICD-10-CM

## 2025-06-10 DIAGNOSIS — I10 ESSENTIAL HYPERTENSION: ICD-10-CM

## 2025-06-10 DIAGNOSIS — F33.1 MAJOR DEPRESSIVE DISORDER, RECURRENT, MODERATE: ICD-10-CM

## 2025-06-10 DIAGNOSIS — F41.9 ANXIETY: ICD-10-CM

## 2025-06-10 DIAGNOSIS — Z79.899 ENCOUNTER FOR LONG-TERM (CURRENT) USE OF MEDICATIONS: ICD-10-CM

## 2025-06-10 RX ORDER — ALPRAZOLAM 0.5 MG/1
0.5 TABLET ORAL 2 TIMES DAILY PRN
Qty: 60 TABLET | Refills: 3 | Status: SHIPPED | OUTPATIENT
Start: 2025-06-10 | End: 2025-12-07

## 2025-06-10 RX ORDER — LISINOPRIL 10 MG/1
10 TABLET ORAL DAILY
Start: 2025-06-10 | End: 2025-12-07

## 2025-06-10 NOTE — PROGRESS NOTES
SUBJECTIVE:    Patient ID: Al Romero is a 44 y.o. male.    Chief Complaint: Follow-up (No bottles//Pt is here for a check up and medication refills//MUNIR )    History of Present Illness    CHIEF COMPLAINT:  Al presents today for follow up of elevated blood pressure.    HYPERTENSION:  He reports blood pressure was elevated at 155/98. After resuming Lisinopril, his blood pressure improved to 145/90 and subsequently to 130/68. He has multiple 90-day supply bottles of Lisinopril at home.    MEDICATIONS:  He continues Effexor 37.5 mg but reports no significant improvement. He also takes Alprazolam, which was refilled approximately 1.5 weeks ago.    NICOTINE DEPENDENCE:  He attempted smoking cessation with nicotine patches for 4-5 days but discontinued due to experiencing tachycardia, with heart rate reaching approximately 135 BPM.    CONSTITUTIONAL:  He reports persistent fatigue and exhaustion over the past weekend.      ROS:  General: -fever, -chills, +fatigue, -weight gain, -weight loss  Eyes: -vision changes, -redness, -discharge  ENT: -ear pain, -nasal congestion, -sore throat  Cardiovascular: -chest pain, -palpitations, -lower extremity edema, +feelings of fast heart rate  Respiratory: -cough, -shortness of breath  Gastrointestinal: -abdominal pain, -nausea, -vomiting, -diarrhea, -constipation, -blood in stool  Genitourinary: -dysuria, -hematuria, -frequency  Musculoskeletal: -joint pain, -muscle pain  Skin: -rash, -lesion  Neurological: -headache, -dizziness, -numbness, -tingling  Psychiatric: -anxiety, -depression, -sleep difficulty         No visits with results within 6 Month(s) from this visit.   Latest known visit with results is:   Office Visit on 01/05/2024   Component Date Value Ref Range Status    Amphetamines 07/25/2024 NEGATIVE  <500 ng/mL Final    Barbiturates 07/25/2024 NEGATIVE  <300 ng/mL Final    Benzodiazepines 07/25/2024 POSITIVE (A)  <100 ng/mL Final    Alphahydroxyalprazolam  07/25/2024 209 (H)  <25 ng/mL Final    Alphahydroxymidazolam 07/25/2024 NEGATIVE  <50 ng/mL Final    Alphahydroxytriazolam 07/25/2024 NEGATIVE  <50 ng/mL Final    Aminoclonazepam 07/25/2024 NEGATIVE  <25 ng/mL Final    hydroxyethylflurazepam UR GC/MS 07/25/2024 NEGATIVE  <50 ng/mL Final    Lorazepam 07/25/2024 NEGATIVE  <50 ng/mL Final    Nordiazepam Lvl 07/25/2024 NEGATIVE  <50 ng/mL Final    Oxazepam 07/25/2024 NEGATIVE  <50 ng/mL Final    Temazepam GC/MS Conf 07/25/2024 NEGATIVE  <50 ng/mL Final    Benzodiazepines Comments 07/25/2024 SEE COMMENT   Final    Cocaine Metabolites 07/25/2024 NEGATIVE  <150 ng/mL Final    Methadone 07/25/2024 NEGATIVE  <100 ng/mL Final    Opiates 07/25/2024 NEGATIVE  <100 ng/mL Final    Oxycodone 07/25/2024 NEGATIVE  <100 ng/mL Final    Phencyclidine 07/25/2024 NEGATIVE  <25 ng/mL Final    Creatinine 07/25/2024 169.5  > or = 20.0 mg/dL Final    pH 07/25/2024 5.6  4.5 - 9.0 Final    Oxidants, Urine ([facility]ox) 07/25/2024 NEGATIVE  <200 mcg/mL Final    Notes and Comments 07/25/2024 SEE COMMENT   Final    WBC 07/25/2024 6.7  3.8 - 10.8 Thousand/uL Final    RBC 07/25/2024 4.79  4.20 - 5.80 Million/uL Final    Hemoglobin 07/25/2024 15.6  13.2 - 17.1 g/dL Final    Hematocrit 07/25/2024 47.5  38.5 - 50.0 % Final    MCV 07/25/2024 99.2  80.0 - 100.0 fL Final    MCH 07/25/2024 32.6  27.0 - 33.0 pg Final    MCHC 07/25/2024 32.8  32.0 - 36.0 g/dL Final    RDW 07/25/2024 12.7  11.0 - 15.0 % Final    Platelets 07/25/2024 257  140 - 400 Thousand/uL Final    MPV 07/25/2024 10.7  7.5 - 12.5 fL Final    Neutrophils, Abs 07/25/2024 3,203  1,500 - 7,800 cells/uL Final    Lymph # 07/25/2024 1,836  850 - 3,900 cells/uL Final    Mono # 07/25/2024 1,119 (H)  200 - 950 cells/uL Final    Eos # 07/25/2024 422  15 - 500 cells/uL Final    Baso # 07/25/2024 121  0 - 200 cells/uL Final    Neutrophils Relative 07/25/2024 47.8  % Final    Lymph % 07/25/2024 27.4  % Final    Mono % 07/25/2024 16.7  % Final    Eosinophil %  07/25/2024 6.3  % Final    Basophil % 07/25/2024 1.8  % Final    Glucose 07/25/2024 100 (H)  65 - 99 mg/dL Final    BUN 07/25/2024 15  7 - 25 mg/dL Final    Creatinine 07/25/2024 0.86  0.60 - 1.29 mg/dL Final    eGFR 07/25/2024 112  > OR = 60 mL/min/1.73m2 Final    BUN/Creatinine Ratio 07/25/2024 SEE NOTE:  6 - 22 (calc) Final    Sodium 07/25/2024 141  135 - 146 mmol/L Final    Potassium 07/25/2024 4.5  3.5 - 5.3 mmol/L Final    Chloride 07/25/2024 106  98 - 110 mmol/L Final    CO2 07/25/2024 27  20 - 32 mmol/L Final    Calcium 07/25/2024 9.7  8.6 - 10.3 mg/dL Final    Total Protein 07/25/2024 7.0  6.1 - 8.1 g/dL Final    Albumin 07/25/2024 4.6  3.6 - 5.1 g/dL Final    Globulin, Total 07/25/2024 2.4  1.9 - 3.7 g/dL (calc) Final    Albumin/Globulin Ratio 07/25/2024 1.9  1.0 - 2.5 (calc) Final    Total Bilirubin 07/25/2024 0.4  0.2 - 1.2 mg/dL Final    Alkaline Phosphatase 07/25/2024 101  36 - 130 U/L Final    AST 07/25/2024 12  10 - 40 U/L Final    ALT 07/25/2024 16  9 - 46 U/L Final    Cholesterol 07/25/2024 166  <200 mg/dL Final    HDL 07/25/2024 67  > OR = 40 mg/dL Final    Triglycerides 07/25/2024 54  <150 mg/dL Final    LDL Cholesterol 07/25/2024 86  mg/dL (calc) Final    HDL/Cholesterol Ratio 07/25/2024 2.5  <5.0 (calc) Final    Non HDL Chol. (LDL+VLDL) 07/25/2024 99  <130 mg/dL (calc) Final    TSH 07/25/2024 2.32  0.40 - 4.50 mIU/L Final    Color, UA 07/25/2024 YELLOW  YELLOW Final    Appearance, UA 07/25/2024 CLEAR  CLEAR Final    Specific Gravity, UA 07/25/2024 1.025  1.001 - 1.035 Final    pH, UA 07/25/2024 5.5  5.0 - 8.0 Final    Glucose, UA 07/25/2024 NEGATIVE  NEGATIVE Final    Bilirubin, UA 07/25/2024 NEGATIVE  NEGATIVE Final    Ketones, UA 07/25/2024 NEGATIVE  NEGATIVE Final    Occult Blood UA 07/25/2024 NEGATIVE  NEGATIVE Final    Protein, UA 07/25/2024 NEGATIVE  NEGATIVE Final    Nitrite, UA 07/25/2024 NEGATIVE  NEGATIVE Final    Leukocytes, UA 07/25/2024 NEGATIVE  NEGATIVE Final    WBC Casts, UA  "07/25/2024 NONE SEEN  < OR = 5 /HPF Final    RBC Casts, UA 07/25/2024 NONE SEEN  < OR = 2 /HPF Final    Squam Epithel, UA 07/25/2024 NONE SEEN  < OR = 5 /HPF Final    Bacteria, UA 07/25/2024 NONE SEEN  NONE SEEN /HPF Final    Hyaline Casts, UA 07/25/2024 NONE SEEN  NONE SEEN /LPF Final    Service Cmt: 07/25/2024 SEE COMMENT   Final    Reflexive Urine Culture 07/25/2024 SEE COMMENT   Final       Past Medical History:   Diagnosis Date    Anxiety      History reviewed. No pertinent surgical history.  No family history on file.    Marital Status:   Alcohol History:  reports current alcohol use of about 2.0 standard drinks of alcohol per week.  Tobacco History:  reports that he has been smoking cigarettes and vaping with nicotine. He started smoking about 23 years ago. He has a 36 pack-year smoking history. He has been exposed to tobacco smoke. He has never used smokeless tobacco.  Drug History:  reports no history of drug use.    Review of patient's allergies indicates:   Allergen Reactions    Msg [glutamic acid]      Current Medications[1]    Objective:      Vitals:    06/10/25 0704   BP: 130/68   Pulse: 60   SpO2: 98%   Weight: 83.4 kg (183 lb 12.8 oz)   Height: 5' 10" (1.778 m)     Physical Exam    Vitals: Blood pressure: 130/68.  General: No acute distress. Well-developed. Well-nourished.  Eyes: EOMI. Sclerae anicteric.  HENT: Normocephalic. Atraumatic. Nares patent. Moist oral mucosa.  Ears: Bilateral TMs clear. Bilateral EACs clear.  Cardiovascular: Regular rate. Regular rhythm. No murmurs. No rubs. No gallops. Normal S1, S2.  Respiratory: Normal respiratory effort. Clear to auscultation bilaterally. No rales. No rhonchi. No wheezing.  Abdomen: Soft. Non-tender. Non-distended. Normoactive bowel sounds.  Musculoskeletal: No  obvious deformity.  Extremities: No lower extremity edema.  Neurological: Alert & oriented x3. No slurred speech. Normal gait.  Psychiatric: Normal mood. Normal affect. Good insight. " Good judgment.  Skin: Warm. Dry. No rash.         Assessment:       Assessment & Plan    - BP target acceptable with current readings (130/68), target less than 140/90 mmHg.    MAJOR DEPRESSIVE DISORDER, RECURRENT, MODERATE:  - Al reports exhaustion over the past weekend, possibly related to medication or condition.  - No noticeable difference observed with Effexor 37.5 mg daily.  - Will continue current Effexor dose with 30-day prescription, but discussed potential dose increase if symptoms don't improve.  - Prescribed Alprazolam as needed for symptom management.    HYPERTENSION:  - Monitored the patient's blood pressure, which was 155/98, then 145/90, and today it is 130/68 after restarting Lisinopril.  - This is considered stable and within the target range.  - Discussed maintaining blood pressure below 140/90 and monitoring for symptoms like dizziness if pressure drops below 120.  - Resumed Lisinopril 10 mg daily for BP management, which reaches steady state after about 5 days of consistent use.  - Current dose is considered low and will be maintained unless symptoms of hypotension occur.  - Educated the patient on symptoms of hypotension to monitor (dizziness, lightheadedness).    NICOTINE DEPENDENCE:  - Al tried nicotine patches for 4-5 days but stopped due to heart rate changes, including a drop in heart rate and also an increase to 135 (see Tachycardia).    TACHYCARDIA:  - Evaluated the patient's heart rate increase to 135, which appears to be related to the use of nicotine patches that were subsequently discontinued.    FOLLOW-UP:  - Ordered fasting CBC, CMP, and lipid panel for annual labs.       Plan:       Anxiety  -     DRUG MONITOR, PANEL 4, W/CONF, URINE; Future; Expected date: 06/10/2025  -     ALPRAZolam (XANAX) 0.5 MG tablet; Take 1 tablet (0.5 mg total) by mouth 2 (two) times daily as needed for Anxiety.  Dispense: 60 tablet; Refill: 3    Encounter for therapeutic drug monitoring  -      DRUG MONITOR, PANEL 4, W/CONF, URINE; Future; Expected date: 06/10/2025    Encounter for long-term (current) use of medications  -     DRUG MONITOR, PANEL 4, W/CONF, URINE; Future; Expected date: 06/10/2025  -     CBC W/ AUTO DIFFERENTIAL; Future; Expected date: 06/10/2025  -     COMPREHENSIVE METABOLIC PANEL; Future; Expected date: 06/10/2025  -     Lipid Panel; Future; Expected date: 06/10/2025  -     TSH w/reflex to FT4; Future; Expected date: 06/10/2025  -     HEPATITIS C ANTIBODY; Future; Expected date: 06/10/2025  -     HIV 1/2 Ag/Ab (4th Gen); Future; Expected date: 06/10/2025  -     HEMOGLOBIN A1C; Future; Expected date: 06/10/2025    Major depressive disorder, recurrent, moderate    Essential hypertension  Comments:  Remains improved with the addition of the lisinopril. Will keep this on board. NO changes.  Orders:  -     lisinopriL 10 MG tablet; Take 1 tablet (10 mg total) by mouth once daily.    Anxiety  Comments:  unable to tolerate paxil. will keep alprazolam on board prn use. UDS today  Orders:  -     DRUG MONITOR, PANEL 4, W/CONF, URINE; Future; Expected date: 06/10/2025  -     ALPRAZolam (XANAX) 0.5 MG tablet; Take 1 tablet (0.5 mg total) by mouth 2 (two) times daily as needed for Anxiety.  Dispense: 60 tablet; Refill: 3    Anxiety  Comments:  start effexor- will keep alprazolam strictly prn now. f/u 3 mos.  Orders:  -     DRUG MONITOR, PANEL 4, W/CONF, URINE; Future; Expected date: 06/10/2025  -     ALPRAZolam (XANAX) 0.5 MG tablet; Take 1 tablet (0.5 mg total) by mouth 2 (two) times daily as needed for Anxiety.  Dispense: 60 tablet; Refill: 3      Follow up in about 6 months (around 12/10/2025).    This note was generated with the assistance of ambient listening technology. Verbal consent was obtained by the patient and accompanying visitor(s) for the recording of patient appointment to facilitate this note. I attest to having reviewed and edited the generated note for accuracy, though some syntax  or spelling errors may persist. Please contact the author of this note for any clarification.          6/10/2025 Devaughn Banegas PA-C           [1]   Current Outpatient Medications:     ALPRAZolam (XANAX) 0.5 MG tablet, Take 1 tablet (0.5 mg total) by mouth 2 (two) times daily as needed for Anxiety., Disp: 60 tablet, Rfl: 3    lisinopriL 10 MG tablet, Take 1 tablet (10 mg total) by mouth once daily., Disp: , Rfl:     venlafaxine (EFFEXOR-XR) 37.5 MG 24 hr capsule, Take 1 capsule (37.5 mg total) by mouth once daily., Disp: 30 capsule, Rfl: 11

## 2025-08-14 ENCOUNTER — TELEPHONE (OUTPATIENT)
Dept: PHARMACY | Facility: CLINIC | Age: 45
End: 2025-08-14
Payer: COMMERCIAL